# Patient Record
Sex: MALE | Race: ASIAN | NOT HISPANIC OR LATINO | URBAN - METROPOLITAN AREA
[De-identification: names, ages, dates, MRNs, and addresses within clinical notes are randomized per-mention and may not be internally consistent; named-entity substitution may affect disease eponyms.]

---

## 2021-04-26 PROBLEM — Z00.00 ENCOUNTER FOR PREVENTIVE HEALTH EXAMINATION: Status: ACTIVE | Noted: 2021-04-26

## 2021-04-28 PROBLEM — Z86.39 HISTORY OF HYPERLIPIDEMIA: Status: RESOLVED | Noted: 2021-04-28 | Resolved: 2021-04-28

## 2021-04-28 PROBLEM — Z86.39 HISTORY OF DIABETES MELLITUS: Status: RESOLVED | Noted: 2021-04-28 | Resolved: 2021-04-28

## 2021-04-28 PROBLEM — Z86.79 HISTORY OF CORONARY ARTERY DISEASE: Status: RESOLVED | Noted: 2021-04-28 | Resolved: 2021-04-28

## 2021-04-28 PROBLEM — Z87.891 FORMER SMOKER: Status: ACTIVE | Noted: 2021-04-28

## 2021-04-28 RX ORDER — TAMSULOSIN HYDROCHLORIDE 0.4 MG/1
0.4 CAPSULE ORAL
Refills: 0 | Status: ACTIVE | COMMUNITY

## 2021-04-28 RX ORDER — ROSUVASTATIN CALCIUM 20 MG/1
20 TABLET, FILM COATED ORAL
Refills: 0 | Status: ACTIVE | COMMUNITY

## 2021-04-28 RX ORDER — GLIPIZIDE 10 MG/1
10 TABLET ORAL
Refills: 0 | Status: ACTIVE | COMMUNITY

## 2021-04-28 RX ORDER — SITAGLIPTIN 100 MG/1
100 TABLET, FILM COATED ORAL
Refills: 0 | Status: ACTIVE | COMMUNITY

## 2021-04-28 RX ORDER — ISOSORBIDE MONONITRATE 30 MG
30 TABLET, EXTENDED RELEASE 24 HR ORAL
Refills: 0 | Status: ACTIVE | COMMUNITY

## 2021-04-28 RX ORDER — CLOPIDOGREL 75 MG/1
75 TABLET, FILM COATED ORAL
Refills: 0 | Status: ACTIVE | COMMUNITY

## 2021-04-28 RX ORDER — OLMESARTAN MEDOXOMIL 20 MG/1
20 TABLET, FILM COATED ORAL
Refills: 0 | Status: ACTIVE | COMMUNITY

## 2021-04-28 RX ORDER — FINASTERIDE 5 MG/1
5 TABLET, FILM COATED ORAL
Refills: 0 | Status: ACTIVE | COMMUNITY

## 2021-04-28 RX ORDER — ASPIRIN 81 MG
81 TABLET, DELAYED RELEASE (ENTERIC COATED) ORAL
Refills: 0 | Status: ACTIVE | COMMUNITY

## 2021-04-30 ENCOUNTER — OUTPATIENT (OUTPATIENT)
Dept: OUTPATIENT SERVICES | Facility: HOSPITAL | Age: 76
LOS: 1 days | End: 2021-04-30
Payer: COMMERCIAL

## 2021-04-30 ENCOUNTER — APPOINTMENT (OUTPATIENT)
Dept: THORACIC SURGERY | Facility: CLINIC | Age: 76
End: 2021-04-30
Payer: MEDICARE

## 2021-04-30 VITALS
SYSTOLIC BLOOD PRESSURE: 146 MMHG | HEART RATE: 71 BPM | OXYGEN SATURATION: 95 % | RESPIRATION RATE: 17 BRPM | TEMPERATURE: 98 F | DIASTOLIC BLOOD PRESSURE: 67 MMHG | HEIGHT: 66 IN | BODY MASS INDEX: 26.36 KG/M2 | WEIGHT: 164 LBS

## 2021-04-30 DIAGNOSIS — Z86.79 PERSONAL HISTORY OF OTHER DISEASES OF THE CIRCULATORY SYSTEM: ICD-10-CM

## 2021-04-30 DIAGNOSIS — Z86.39 PERSONAL HISTORY OF OTHER ENDOCRINE, NUTRITIONAL AND METABOLIC DISEASE: ICD-10-CM

## 2021-04-30 DIAGNOSIS — Z01.818 ENCOUNTER FOR OTHER PREPROCEDURAL EXAMINATION: ICD-10-CM

## 2021-04-30 DIAGNOSIS — R91.1 SOLITARY PULMONARY NODULE: ICD-10-CM

## 2021-04-30 DIAGNOSIS — Z87.891 PERSONAL HISTORY OF NICOTINE DEPENDENCE: ICD-10-CM

## 2021-04-30 LAB
ALBUMIN SERPL ELPH-MCNC: 4.5 G/DL — SIGNIFICANT CHANGE UP (ref 3.3–5)
ALP SERPL-CCNC: 68 U/L — SIGNIFICANT CHANGE UP (ref 40–120)
ALT FLD-CCNC: 20 U/L — SIGNIFICANT CHANGE UP (ref 10–45)
ANION GAP SERPL CALC-SCNC: 13 MMOL/L — SIGNIFICANT CHANGE UP (ref 5–17)
APPEARANCE UR: CLEAR — SIGNIFICANT CHANGE UP
APTT BLD: 31.7 SEC — SIGNIFICANT CHANGE UP (ref 27.5–35.5)
AST SERPL-CCNC: 24 U/L — SIGNIFICANT CHANGE UP (ref 10–40)
BASOPHILS # BLD AUTO: 0.06 K/UL — SIGNIFICANT CHANGE UP (ref 0–0.2)
BASOPHILS NFR BLD AUTO: 0.9 % — SIGNIFICANT CHANGE UP (ref 0–2)
BILIRUB SERPL-MCNC: 0.4 MG/DL — SIGNIFICANT CHANGE UP (ref 0.2–1.2)
BILIRUB UR-MCNC: NEGATIVE — SIGNIFICANT CHANGE UP
BUN SERPL-MCNC: 16 MG/DL — SIGNIFICANT CHANGE UP (ref 7–23)
CALCIUM SERPL-MCNC: 9.9 MG/DL — SIGNIFICANT CHANGE UP (ref 8.4–10.5)
CHLORIDE SERPL-SCNC: 105 MMOL/L — SIGNIFICANT CHANGE UP (ref 96–108)
CHOLEST SERPL-MCNC: 144 MG/DL — SIGNIFICANT CHANGE UP
CO2 SERPL-SCNC: 23 MMOL/L — SIGNIFICANT CHANGE UP (ref 22–31)
COLOR SPEC: YELLOW — SIGNIFICANT CHANGE UP
CREAT SERPL-MCNC: 0.97 MG/DL — SIGNIFICANT CHANGE UP (ref 0.5–1.3)
DIFF PNL FLD: NEGATIVE — SIGNIFICANT CHANGE UP
EOSINOPHIL # BLD AUTO: 0.53 K/UL — HIGH (ref 0–0.5)
EOSINOPHIL NFR BLD AUTO: 7.6 % — HIGH (ref 0–6)
GLUCOSE SERPL-MCNC: 195 MG/DL — HIGH (ref 70–99)
GLUCOSE UR QL: NEGATIVE — SIGNIFICANT CHANGE UP
HCT VFR BLD CALC: 41.3 % — SIGNIFICANT CHANGE UP (ref 39–50)
HDLC SERPL-MCNC: 59 MG/DL — SIGNIFICANT CHANGE UP
HGB BLD-MCNC: 13.6 G/DL — SIGNIFICANT CHANGE UP (ref 13–17)
IMM GRANULOCYTES NFR BLD AUTO: 0.3 % — SIGNIFICANT CHANGE UP (ref 0–1.5)
INR BLD: 0.96 — SIGNIFICANT CHANGE UP (ref 0.88–1.16)
KETONES UR-MCNC: NEGATIVE — SIGNIFICANT CHANGE UP
LEUKOCYTE ESTERASE UR-ACNC: NEGATIVE — SIGNIFICANT CHANGE UP
LIPID PNL WITH DIRECT LDL SERPL: 45 MG/DL — SIGNIFICANT CHANGE UP
LYMPHOCYTES # BLD AUTO: 2.79 K/UL — SIGNIFICANT CHANGE UP (ref 1–3.3)
LYMPHOCYTES # BLD AUTO: 40.1 % — SIGNIFICANT CHANGE UP (ref 13–44)
MCHC RBC-ENTMCNC: 30.8 PG — SIGNIFICANT CHANGE UP (ref 27–34)
MCHC RBC-ENTMCNC: 32.9 GM/DL — SIGNIFICANT CHANGE UP (ref 32–36)
MCV RBC AUTO: 93.7 FL — SIGNIFICANT CHANGE UP (ref 80–100)
MONOCYTES # BLD AUTO: 0.66 K/UL — SIGNIFICANT CHANGE UP (ref 0–0.9)
MONOCYTES NFR BLD AUTO: 9.5 % — SIGNIFICANT CHANGE UP (ref 2–14)
NEUTROPHILS # BLD AUTO: 2.9 K/UL — SIGNIFICANT CHANGE UP (ref 1.8–7.4)
NEUTROPHILS NFR BLD AUTO: 41.6 % — LOW (ref 43–77)
NITRITE UR-MCNC: NEGATIVE — SIGNIFICANT CHANGE UP
NON HDL CHOLESTEROL: 85 MG/DL — SIGNIFICANT CHANGE UP
NRBC # BLD: 0 /100 WBCS — SIGNIFICANT CHANGE UP (ref 0–0)
PH UR: 6 — SIGNIFICANT CHANGE UP (ref 5–8)
PLATELET # BLD AUTO: 157 K/UL — SIGNIFICANT CHANGE UP (ref 150–400)
POTASSIUM SERPL-MCNC: 4.1 MMOL/L — SIGNIFICANT CHANGE UP (ref 3.5–5.3)
POTASSIUM SERPL-SCNC: 4.1 MMOL/L — SIGNIFICANT CHANGE UP (ref 3.5–5.3)
PROT SERPL-MCNC: 7.5 G/DL — SIGNIFICANT CHANGE UP (ref 6–8.3)
PROT UR-MCNC: NEGATIVE MG/DL — SIGNIFICANT CHANGE UP
PROTHROM AB SERPL-ACNC: 11.5 SEC — SIGNIFICANT CHANGE UP (ref 10.6–13.6)
RBC # BLD: 4.41 M/UL — SIGNIFICANT CHANGE UP (ref 4.2–5.8)
RBC # FLD: 13.2 % — SIGNIFICANT CHANGE UP (ref 10.3–14.5)
SODIUM SERPL-SCNC: 141 MMOL/L — SIGNIFICANT CHANGE UP (ref 135–145)
SP GR SPEC: 1.01 — SIGNIFICANT CHANGE UP (ref 1–1.03)
TRIGL SERPL-MCNC: 199 MG/DL — HIGH
UROBILINOGEN FLD QL: 0.2 E.U./DL — SIGNIFICANT CHANGE UP
WBC # BLD: 6.96 K/UL — SIGNIFICANT CHANGE UP (ref 3.8–10.5)
WBC # FLD AUTO: 6.96 K/UL — SIGNIFICANT CHANGE UP (ref 3.8–10.5)

## 2021-04-30 PROCEDURE — 85025 COMPLETE CBC W/AUTO DIFF WBC: CPT

## 2021-04-30 PROCEDURE — 86901 BLOOD TYPING SEROLOGIC RH(D): CPT

## 2021-04-30 PROCEDURE — 99072 ADDL SUPL MATRL&STAF TM PHE: CPT

## 2021-04-30 PROCEDURE — 86850 RBC ANTIBODY SCREEN: CPT

## 2021-04-30 PROCEDURE — 81003 URINALYSIS AUTO W/O SCOPE: CPT

## 2021-04-30 PROCEDURE — 99204 OFFICE O/P NEW MOD 45 MIN: CPT

## 2021-04-30 PROCEDURE — 85610 PROTHROMBIN TIME: CPT

## 2021-04-30 PROCEDURE — 85730 THROMBOPLASTIN TIME PARTIAL: CPT

## 2021-04-30 PROCEDURE — 86900 BLOOD TYPING SEROLOGIC ABO: CPT

## 2021-04-30 PROCEDURE — 80053 COMPREHEN METABOLIC PANEL: CPT

## 2021-04-30 PROCEDURE — 36415 COLL VENOUS BLD VENIPUNCTURE: CPT

## 2021-04-30 PROCEDURE — 80061 LIPID PANEL: CPT

## 2021-04-30 NOTE — PHYSICAL EXAM
[] : no respiratory distress [Apical Impulse] : the apical impulse was normal [Respiration, Rhythm And Depth] : normal respiratory rhythm and effort [Examination Of The Chest] : the chest was normal in appearance [Abnormal Walk] : normal gait [Musculoskeletal - Swelling] : no joint swelling seen [Skin Color & Pigmentation] : normal skin color and pigmentation [Skin Turgor] : normal skin turgor [No Focal Deficits] : no focal deficits [Oriented To Time, Place, And Person] : oriented to person, place, and time

## 2021-05-01 ENCOUNTER — LABORATORY RESULT (OUTPATIENT)
Age: 76
End: 2021-05-01

## 2021-05-04 ENCOUNTER — OUTPATIENT (OUTPATIENT)
Dept: OUTPATIENT SERVICES | Facility: HOSPITAL | Age: 76
LOS: 1 days | End: 2021-05-04
Payer: MEDICARE

## 2021-05-04 DIAGNOSIS — R91.1 SOLITARY PULMONARY NODULE: ICD-10-CM

## 2021-05-04 PROCEDURE — 94726 PLETHYSMOGRAPHY LUNG VOLUMES: CPT | Mod: 26

## 2021-05-04 PROCEDURE — 94729 DIFFUSING CAPACITY: CPT

## 2021-05-04 PROCEDURE — 94010 BREATHING CAPACITY TEST: CPT | Mod: 26

## 2021-05-04 PROCEDURE — 94729 DIFFUSING CAPACITY: CPT | Mod: 26

## 2021-05-04 PROCEDURE — 94726 PLETHYSMOGRAPHY LUNG VOLUMES: CPT

## 2021-05-04 PROCEDURE — 94060 EVALUATION OF WHEEZING: CPT

## 2021-05-04 PROCEDURE — 94760 N-INVAS EAR/PLS OXIMETRY 1: CPT

## 2021-05-10 ENCOUNTER — NON-APPOINTMENT (OUTPATIENT)
Age: 76
End: 2021-05-10

## 2021-05-10 NOTE — END OF VISIT
[Time Spent: ___ minutes] : I have spent [unfilled] minutes of time on the encounter. [FreeTextEntry3] : I, ETHEL COLMENARES , am scribing for and in the presence of NITHIN ZARCO the following sections: history of present illness, past medical/family/surgical/family/social history, review of systems, vital signs, physical exam, and disposition.\par  \par I personally performed the services described in the documentation, reviewed the documentation recorded by the scribe in my presence and it accurately and completely records my words and actions.

## 2021-05-10 NOTE — CONSULT LETTER
[Consult Letter:] : I had the pleasure of evaluating your patient, [unfilled]. [Please see my note below.] : Please see my note below. [Consult Closing:] : Thank you very much for allowing me to participate in the care of this patient.  If you have any questions, please do not hesitate to contact me. [Sincerely,] : Sincerely, [Dear  ___] : Dear  [unfilled], [FreeTextEntry3] : Patrick Paul MD\par Professor, Cardiovascular & Thoracic Surgery\par [employer]\par Director of the [facility] \par Director of Thoracic Surgery, [facility]\par \par [facility]\par [address]\par [facility] 4th Floor\par [place] [address]\par Phone: [phone]\par Fax: [phone]

## 2021-05-10 NOTE — ASSESSMENT
[FreeTextEntry1] : 76 year old male, Cantonese speaking, former smoker,  PMHx, HLD, DM, PAD< CAD s/p stents 2006, 2009, referred by PCP Dr. Son Ibarra for evaluation of lung nodule found on lung cancer screening CT. \par \par CT Chest 12/20/19\par - Stable RUL groundglass nodule dating back to 2015. Continued annual follow up is recommended. \par - Additional stable subcentimeter pulmonary nodules. \par - No new nodule is identified.\par \par CT Chest 4/23/21\par - Persistent 1.2cm subsolid ground-glass nodular opacity in the RUL. The overall size of the lesion is not significantly changed dating back to 2015. However, a developing small solid component (0.3cm) and increased density are noted within the lesion. This raises suspicious for a malignant process in the adenocarcinomatous spectrum. Histological correlation should be considered. \par - Additional subcentimeter nodules are stable. \par - Thyromegaly.\par - Hepatic steatosis. \par \par Patient reports occasional cough with mucus. Denies SOB, cough, hemoptysis, chest discomfort, fever/chills. \par \par Previous CT chest 4/23/21 reviewed and revealed a slight increase in density of the RUL lung nodule, likely an indole lung cancer.  In order to fully rule out malignancy a surgical biopsy is advised. Therefore, we recommended surgical excisional biopsy with wedge resection followed by intraoperative frozen section and possible anatomic lobectomy with lymph node dissection. The surgical approach, risks, benefits, and alternatives were explained to the patient, who understood and agreed to the above. \par \par Will order PET to evaluate for extrathoracic disease and PFTs to evaluate lung function. \par \par I have reviewed the patient's medical records and diagnostic images at the time of this office consultation and have made the following recommendation.\par Plan:\par 1. PFTs\par 2. PET\par 3. Medical clearance\par 4. Cardiac clearance, hold PLAVIX prior to surgery. Stay on ASA 81mg\par 5. Labs drawn today\par 6. RVATS, robotic assisted, wedge resection of RUL, possible segmentectomy, possible lobectomy, MLND

## 2021-05-11 ENCOUNTER — LABORATORY RESULT (OUTPATIENT)
Age: 76
End: 2021-05-11

## 2021-05-13 ENCOUNTER — TRANSCRIPTION ENCOUNTER (OUTPATIENT)
Age: 76
End: 2021-05-13

## 2021-05-13 VITALS
HEIGHT: 66 IN | DIASTOLIC BLOOD PRESSURE: 72 MMHG | TEMPERATURE: 98 F | SYSTOLIC BLOOD PRESSURE: 157 MMHG | WEIGHT: 168.65 LBS | OXYGEN SATURATION: 96 % | HEART RATE: 72 BPM | RESPIRATION RATE: 18 BRPM

## 2021-05-13 NOTE — H&P ADULT - ASSESSMENT
Patient cleared by PCP and cardiologist. Plan for RVATS, robotic assisted, wedge resection of RUL, possible segmentectomy, possible lobectomy, MLND.    Patient cleared by PCP and cardiologist. Plan for RVATS, robotic assisted, wedge resection of RUL, possible segmentectomy, possible lobectomy, MLND.     Neurovascular: No delirium. Pain well controlled with current regimen.  -PRN's.    Cardiovascular:   - CAD with previously stent placement:  - resume plavix and ASA post op   - continue Statin   - closely cardiac monitor     Respiratory: lung nodule   - intervention for diagnosis and staging   - keep 02 Sat = 98% on RA.  - If on oxygen wean to RA from for O2 Sat > 93%.  - Encourage C+DB and Use of IS 10x / hr while awake.  - post up CXR and daily     GI: Stable.  -NPO after MN.  -PPX.  -PO Diet.    Renal / : stable   -Monitor renal function.  -Monitor I/O's.    Endocrine: DMT2   - pending A1c.  - resume antihyperglycemic control     Hematologic: H/H stable  - type and screen and hold blood product for procedures  - continue to monitor for CBC and Coagulation Panel.    ID: afebrile and WBC stable  - continue to monitor for Tempature, CBC.  - Observe for SIRS/Sepsis Syndrome.    Prophylaxis:  - DVT prophylaxis with 5000 SubQ Heparin q8h and SCD's    Disposition:  - intervention  - 9LA for post op observation

## 2021-05-13 NOTE — H&P ADULT - NSICDXPASTSURGICALHX_GEN_ALL_CORE_FT
PAST SURGICAL HISTORY:  Surgery, elective Cardiac stents X 3 (about 2016)    Surgery, elective external kose nodule

## 2021-05-13 NOTE — H&P ADULT - HISTORY OF PRESENT ILLNESS
76 year old male, Cantonese speaking, former smoker, PMHx, HLD, DM, PAD< CAD s/p stents 2006, 2009, referred by PCP Dr. Son Ibarra for evaluation of RUL lung nodule found on lung cancer screening CT. Patient is scheduled for RVATS, robotic assisted, wedge resection of RUL, possible segmentectomy, possible lobectomy, MLND.

## 2021-05-13 NOTE — H&P ADULT - NSHPLABSRESULTS_GEN_ALL_CORE
PET 5/3/21  - RUL groundglas nodule shows no metabolic activity.   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

## 2021-05-13 NOTE — H&P ADULT - NSICDXPASTMEDICALHX_GEN_ALL_CORE_FT
PAST MEDICAL HISTORY:  CAD (coronary artery disease)     CAD (coronary artery disease)     DM (diabetes mellitus)     Fatty liver

## 2021-05-14 ENCOUNTER — APPOINTMENT (OUTPATIENT)
Dept: THORACIC SURGERY | Facility: HOSPITAL | Age: 76
End: 2021-05-14

## 2021-05-14 ENCOUNTER — RESULT REVIEW (OUTPATIENT)
Age: 76
End: 2021-05-14

## 2021-05-14 ENCOUNTER — INPATIENT (INPATIENT)
Facility: HOSPITAL | Age: 76
LOS: 1 days | Discharge: HOME | DRG: 164 | End: 2021-05-16
Attending: THORACIC SURGERY (CARDIOTHORACIC VASCULAR SURGERY) | Admitting: THORACIC SURGERY (CARDIOTHORACIC VASCULAR SURGERY)
Payer: MEDICARE

## 2021-05-14 DIAGNOSIS — Z41.9 ENCOUNTER FOR PROCEDURE FOR PURPOSES OTHER THAN REMEDYING HEALTH STATE, UNSPECIFIED: Chronic | ICD-10-CM

## 2021-05-14 LAB
ANION GAP SERPL CALC-SCNC: 9 MMOL/L — SIGNIFICANT CHANGE UP (ref 5–17)
APTT BLD: 27.6 SEC — SIGNIFICANT CHANGE UP (ref 27.5–35.5)
BASOPHILS # BLD AUTO: 0.05 K/UL — SIGNIFICANT CHANGE UP (ref 0–0.2)
BASOPHILS NFR BLD AUTO: 0.5 % — SIGNIFICANT CHANGE UP (ref 0–2)
BUN SERPL-MCNC: 24 MG/DL — HIGH (ref 7–23)
CALCIUM SERPL-MCNC: 8.8 MG/DL — SIGNIFICANT CHANGE UP (ref 8.4–10.5)
CHLORIDE SERPL-SCNC: 104 MMOL/L — SIGNIFICANT CHANGE UP (ref 96–108)
CO2 SERPL-SCNC: 25 MMOL/L — SIGNIFICANT CHANGE UP (ref 22–31)
CREAT SERPL-MCNC: 0.99 MG/DL — SIGNIFICANT CHANGE UP (ref 0.5–1.3)
EOSINOPHIL # BLD AUTO: 0.11 K/UL — SIGNIFICANT CHANGE UP (ref 0–0.5)
EOSINOPHIL NFR BLD AUTO: 1.1 % — SIGNIFICANT CHANGE UP (ref 0–6)
GLUCOSE BLDC GLUCOMTR-MCNC: 114 MG/DL — HIGH (ref 70–99)
GLUCOSE BLDC GLUCOMTR-MCNC: 174 MG/DL — HIGH (ref 70–99)
GLUCOSE BLDC GLUCOMTR-MCNC: 200 MG/DL — HIGH (ref 70–99)
GLUCOSE BLDC GLUCOMTR-MCNC: 210 MG/DL — HIGH (ref 70–99)
GLUCOSE BLDC GLUCOMTR-MCNC: 423 MG/DL — HIGH (ref 70–99)
GLUCOSE SERPL-MCNC: 206 MG/DL — HIGH (ref 70–99)
HCT VFR BLD CALC: 37.7 % — LOW (ref 39–50)
HGB BLD-MCNC: 12.4 G/DL — LOW (ref 13–17)
IMM GRANULOCYTES NFR BLD AUTO: 0.4 % — SIGNIFICANT CHANGE UP (ref 0–1.5)
INR BLD: 1.04 — SIGNIFICANT CHANGE UP (ref 0.88–1.16)
LYMPHOCYTES # BLD AUTO: 1.55 K/UL — SIGNIFICANT CHANGE UP (ref 1–3.3)
LYMPHOCYTES # BLD AUTO: 14.8 % — SIGNIFICANT CHANGE UP (ref 13–44)
MCHC RBC-ENTMCNC: 31.4 PG — SIGNIFICANT CHANGE UP (ref 27–34)
MCHC RBC-ENTMCNC: 32.9 GM/DL — SIGNIFICANT CHANGE UP (ref 32–36)
MCV RBC AUTO: 95.4 FL — SIGNIFICANT CHANGE UP (ref 80–100)
MONOCYTES # BLD AUTO: 0.4 K/UL — SIGNIFICANT CHANGE UP (ref 0–0.9)
MONOCYTES NFR BLD AUTO: 3.8 % — SIGNIFICANT CHANGE UP (ref 2–14)
NEUTROPHILS # BLD AUTO: 8.29 K/UL — HIGH (ref 1.8–7.4)
NEUTROPHILS NFR BLD AUTO: 79.4 % — HIGH (ref 43–77)
NRBC # BLD: 0 /100 WBCS — SIGNIFICANT CHANGE UP (ref 0–0)
PLATELET # BLD AUTO: 142 K/UL — LOW (ref 150–400)
POTASSIUM SERPL-MCNC: 4.3 MMOL/L — SIGNIFICANT CHANGE UP (ref 3.5–5.3)
POTASSIUM SERPL-SCNC: 4.3 MMOL/L — SIGNIFICANT CHANGE UP (ref 3.5–5.3)
PROTHROM AB SERPL-ACNC: 12.4 SEC — SIGNIFICANT CHANGE UP (ref 10.6–13.6)
RBC # BLD: 3.95 M/UL — LOW (ref 4.2–5.8)
RBC # FLD: 13.1 % — SIGNIFICANT CHANGE UP (ref 10.3–14.5)
SODIUM SERPL-SCNC: 138 MMOL/L — SIGNIFICANT CHANGE UP (ref 135–145)
WBC # BLD: 10.44 K/UL — SIGNIFICANT CHANGE UP (ref 3.8–10.5)
WBC # FLD AUTO: 10.44 K/UL — SIGNIFICANT CHANGE UP (ref 3.8–10.5)

## 2021-05-14 PROCEDURE — 88309 TISSUE EXAM BY PATHOLOGIST: CPT | Mod: 26

## 2021-05-14 PROCEDURE — 71045 X-RAY EXAM CHEST 1 VIEW: CPT | Mod: 26

## 2021-05-14 PROCEDURE — 88305 TISSUE EXAM BY PATHOLOGIST: CPT | Mod: 26

## 2021-05-14 PROCEDURE — S2900 ROBOTIC SURGICAL SYSTEM: CPT | Mod: NC

## 2021-05-14 PROCEDURE — 32669 THORACOSCOPY REMOVE SEGMENT: CPT

## 2021-05-14 PROCEDURE — 32674 THORACOSCOPY LYMPH NODE EXC: CPT

## 2021-05-14 RX ORDER — LIDOCAINE 4 G/100G
1 CREAM TOPICAL DAILY
Refills: 0 | Status: DISCONTINUED | OUTPATIENT
Start: 2021-05-14 | End: 2021-05-16

## 2021-05-14 RX ORDER — HEPARIN SODIUM 5000 [USP'U]/ML
5000 INJECTION INTRAVENOUS; SUBCUTANEOUS EVERY 8 HOURS
Refills: 0 | Status: DISCONTINUED | OUTPATIENT
Start: 2021-05-14 | End: 2021-05-16

## 2021-05-14 RX ORDER — ATORVASTATIN CALCIUM 80 MG/1
20 TABLET, FILM COATED ORAL AT BEDTIME
Refills: 0 | Status: DISCONTINUED | OUTPATIENT
Start: 2021-05-14 | End: 2021-05-14

## 2021-05-14 RX ORDER — DEXTROSE 50 % IN WATER 50 %
25 SYRINGE (ML) INTRAVENOUS ONCE
Refills: 0 | Status: DISCONTINUED | OUTPATIENT
Start: 2021-05-14 | End: 2021-05-16

## 2021-05-14 RX ORDER — SITAGLIPTIN 50 MG/1
1 TABLET, FILM COATED ORAL
Qty: 0 | Refills: 0 | DISCHARGE

## 2021-05-14 RX ORDER — ASPIRIN/CALCIUM CARB/MAGNESIUM 324 MG
81 TABLET ORAL DAILY
Refills: 0 | Status: DISCONTINUED | OUTPATIENT
Start: 2021-05-14 | End: 2021-05-14

## 2021-05-14 RX ORDER — GLUCAGON INJECTION, SOLUTION 0.5 MG/.1ML
1 INJECTION, SOLUTION SUBCUTANEOUS ONCE
Refills: 0 | Status: DISCONTINUED | OUTPATIENT
Start: 2021-05-14 | End: 2021-05-16

## 2021-05-14 RX ORDER — HUMAN INSULIN 100 [IU]/ML
5 INJECTION, SUSPENSION SUBCUTANEOUS ONCE
Refills: 0 | Status: COMPLETED | OUTPATIENT
Start: 2021-05-14 | End: 2021-05-14

## 2021-05-14 RX ORDER — ATORVASTATIN CALCIUM 80 MG/1
20 TABLET, FILM COATED ORAL AT BEDTIME
Refills: 0 | Status: DISCONTINUED | OUTPATIENT
Start: 2021-05-14 | End: 2021-05-16

## 2021-05-14 RX ORDER — CLOPIDOGREL BISULFATE 75 MG/1
75 TABLET, FILM COATED ORAL DAILY
Refills: 0 | Status: DISCONTINUED | OUTPATIENT
Start: 2021-05-14 | End: 2021-05-14

## 2021-05-14 RX ORDER — SODIUM CHLORIDE 9 MG/ML
1000 INJECTION, SOLUTION INTRAVENOUS
Refills: 0 | Status: DISCONTINUED | OUTPATIENT
Start: 2021-05-14 | End: 2021-05-16

## 2021-05-14 RX ORDER — ROSUVASTATIN CALCIUM 5 MG/1
1 TABLET ORAL
Qty: 0 | Refills: 0 | DISCHARGE

## 2021-05-14 RX ORDER — CEFAZOLIN SODIUM 1 G
2000 VIAL (EA) INJECTION EVERY 8 HOURS
Refills: 0 | Status: DISCONTINUED | OUTPATIENT
Start: 2021-05-14 | End: 2021-05-14

## 2021-05-14 RX ORDER — CEFAZOLIN SODIUM 1 G
2000 VIAL (EA) INJECTION EVERY 8 HOURS
Refills: 0 | Status: COMPLETED | OUTPATIENT
Start: 2021-05-14 | End: 2021-05-15

## 2021-05-14 RX ORDER — BUPIVACAINE 13.3 MG/ML
20 INJECTION, SUSPENSION, LIPOSOMAL INFILTRATION ONCE
Refills: 0 | Status: DISCONTINUED | OUTPATIENT
Start: 2021-05-14 | End: 2021-05-14

## 2021-05-14 RX ORDER — INSULIN LISPRO 100/ML
3 VIAL (ML) SUBCUTANEOUS
Refills: 0 | Status: DISCONTINUED | OUTPATIENT
Start: 2021-05-14 | End: 2021-05-14

## 2021-05-14 RX ORDER — DEXTROSE 50 % IN WATER 50 %
15 SYRINGE (ML) INTRAVENOUS ONCE
Refills: 0 | Status: DISCONTINUED | OUTPATIENT
Start: 2021-05-14 | End: 2021-05-16

## 2021-05-14 RX ORDER — CLOPIDOGREL BISULFATE 75 MG/1
1 TABLET, FILM COATED ORAL
Qty: 0 | Refills: 0 | DISCHARGE

## 2021-05-14 RX ORDER — FAMOTIDINE 10 MG/ML
20 INJECTION INTRAVENOUS DAILY
Refills: 0 | Status: DISCONTINUED | OUTPATIENT
Start: 2021-05-14 | End: 2021-05-14

## 2021-05-14 RX ORDER — SENNA PLUS 8.6 MG/1
2 TABLET ORAL AT BEDTIME
Refills: 0 | Status: DISCONTINUED | OUTPATIENT
Start: 2021-05-14 | End: 2021-05-16

## 2021-05-14 RX ORDER — INSULIN LISPRO 100/ML
VIAL (ML) SUBCUTANEOUS
Refills: 0 | Status: DISCONTINUED | OUTPATIENT
Start: 2021-05-14 | End: 2021-05-16

## 2021-05-14 RX ORDER — ASPIRIN/CALCIUM CARB/MAGNESIUM 324 MG
81 TABLET ORAL DAILY
Refills: 0 | Status: DISCONTINUED | OUTPATIENT
Start: 2021-05-14 | End: 2021-05-16

## 2021-05-14 RX ORDER — MORPHINE SULFATE 50 MG/1
2 CAPSULE, EXTENDED RELEASE ORAL EVERY 6 HOURS
Refills: 0 | Status: DISCONTINUED | OUTPATIENT
Start: 2021-05-14 | End: 2021-05-15

## 2021-05-14 RX ORDER — ACETAMINOPHEN 500 MG
1000 TABLET ORAL ONCE
Refills: 0 | Status: COMPLETED | OUTPATIENT
Start: 2021-05-14 | End: 2021-05-14

## 2021-05-14 RX ORDER — ASPIRIN/CALCIUM CARB/MAGNESIUM 324 MG
1 TABLET ORAL
Qty: 0 | Refills: 0 | DISCHARGE

## 2021-05-14 RX ORDER — ISOSORBIDE MONONITRATE 60 MG/1
1 TABLET, EXTENDED RELEASE ORAL
Qty: 0 | Refills: 0 | DISCHARGE

## 2021-05-14 RX ORDER — DEXTROSE 50 % IN WATER 50 %
12.5 SYRINGE (ML) INTRAVENOUS ONCE
Refills: 0 | Status: DISCONTINUED | OUTPATIENT
Start: 2021-05-14 | End: 2021-05-16

## 2021-05-14 RX ORDER — PANTOPRAZOLE SODIUM 20 MG/1
40 TABLET, DELAYED RELEASE ORAL
Refills: 0 | Status: DISCONTINUED | OUTPATIENT
Start: 2021-05-14 | End: 2021-05-16

## 2021-05-14 RX ORDER — INSULIN GLARGINE 100 [IU]/ML
10 INJECTION, SOLUTION SUBCUTANEOUS AT BEDTIME
Refills: 0 | Status: DISCONTINUED | OUTPATIENT
Start: 2021-05-14 | End: 2021-05-14

## 2021-05-14 RX ORDER — OLMESARTAN MEDOXOMIL 5 MG/1
1 TABLET, FILM COATED ORAL
Qty: 0 | Refills: 0 | DISCHARGE

## 2021-05-14 RX ADMIN — Medication 400 MILLIGRAM(S): at 15:42

## 2021-05-14 RX ADMIN — Medication 2: at 11:39

## 2021-05-14 RX ADMIN — ATORVASTATIN CALCIUM 20 MILLIGRAM(S): 80 TABLET, FILM COATED ORAL at 23:05

## 2021-05-14 RX ADMIN — Medication 12: at 16:00

## 2021-05-14 RX ADMIN — Medication 2000 MILLIGRAM(S): at 15:56

## 2021-05-14 RX ADMIN — LIDOCAINE 1 PATCH: 4 CREAM TOPICAL at 23:05

## 2021-05-14 RX ADMIN — LIDOCAINE 1 PATCH: 4 CREAM TOPICAL at 11:10

## 2021-05-14 RX ADMIN — HUMAN INSULIN 5 UNIT(S): 100 INJECTION, SUSPENSION SUBCUTANEOUS at 17:20

## 2021-05-14 RX ADMIN — Medication 4: at 22:44

## 2021-05-14 RX ADMIN — HEPARIN SODIUM 5000 UNIT(S): 5000 INJECTION INTRAVENOUS; SUBCUTANEOUS at 23:02

## 2021-05-14 RX ADMIN — Medication 81 MILLIGRAM(S): at 15:42

## 2021-05-14 RX ADMIN — Medication 1000 MILLIGRAM(S): at 16:00

## 2021-05-14 RX ADMIN — HEPARIN SODIUM 5000 UNIT(S): 5000 INJECTION INTRAVENOUS; SUBCUTANEOUS at 15:42

## 2021-05-14 RX ADMIN — SENNA PLUS 2 TABLET(S): 8.6 TABLET ORAL at 23:03

## 2021-05-14 RX ADMIN — SODIUM CHLORIDE 50 MILLILITER(S): 9 INJECTION, SOLUTION INTRAVENOUS at 18:00

## 2021-05-14 NOTE — BRIEF OPERATIVE NOTE - COMMENTS
Dr. Small was the first assistant for this case including but not limited to docking the robot and right upper lobe segmentectomy     I was present for this procedure and participated as first assistant as described by the PA above, unless otherwise noted below.

## 2021-05-14 NOTE — BRIEF OPERATIVE NOTE - NSICDXBRIEFPROCEDURE_GEN_ALL_CORE_FT
PROCEDURES:  Lobectomy, lung, upper lobe, right, robot-assisted 14-May-2021 10:31:54 segmentectomy Tavares Tong

## 2021-05-14 NOTE — PROGRESS NOTE ADULT - ASSESSMENT
Patient cleared by PCP and cardiologist. Plan for RVATS, robotic assisted, wedge resection of RUL, possible segmentectomy, possible lobectomy, MLND.     Neurovascular: No delirium. Pain well controlled with current regimen.  -PRN's.    Cardiovascular:   - CAD with previously stent placement:  - resume plavix and ASA post op   - continue Statin   - closely cardiac monitor     Respiratory: lung nodule   - intervention for diagnosis and staging   - keep 02 Sat = 98% on RA.  - If on oxygen wean to RA from for O2 Sat > 93%.  - Encourage C+DB and Use of IS 10x / hr while awake.  - post up CXR and daily     GI: Stable.  -NPO after MN.  -PPX.  -PO Diet.    Renal / : stable   -Monitor renal function.  -Monitor I/O's.    Endocrine: DMT2   - pending A1c.  - resume antihyperglycemic control     Hematologic: H/H stable  - type and screen and hold blood product for procedures  - continue to monitor for CBC and Coagulation Panel.    ID: afebrile and WBC stable  - continue to monitor for Tempature, CBC.  - Observe for SIRS/Sepsis Syndrome.    Prophylaxis:  - DVT prophylaxis with 5000 SubQ Heparin q8h and SCD's    Disposition:  - intervention  - 9LA for post op observation  76 year old male, Cantonese speaking, former smoker, PMHx, HLD, DM, PAD< CAD s/p stents 2006, 2009, referred by PCP Dr. Son Ibarra for evaluation of RUL lung nodule found on lung cancer screening CT. Patient presented to [facility] today for elective RVATS, robotic assisted, wedge resection of RUL, possible segmentectomy, MLND. Operation was uncomplicated and pt was transferred to the PACU in stable condition with 1 CT to suction and no airleak.     Neurovascular: No delirium.  - IV tylenol/morphine PRN pain     Cardiovascular:   - CAD: with previously stent placement: continue aspirin, f/u when to restart plavix   - HLD: continue statin   - Monitor hr/bp/tele    Respiratory:   - POD 0 RUL segmentectomy and MLND 2/2 lung nodule  - CT to suction no AL minimal drainage  - CXR showed CT in place, small apical ptx  - Former smoker, wean O2 as tolerated   - Daily CXR  - Encourage C+DB and Use of IS 10x / hr while awake.    GI: Stable.  -ADAT  -PPX.  -Bowel regimen    Renal / : 9/0.99  -Monitor renal function.  -Monitor I/O's.  -Replete lytes PRN     Endocrine: DMT2 on oral meds   - pending A1c, continue ISS  - no hx thyroid disease    Hematologic: H/H 12/37  - H&H stable, continue to monitor  - DVT ppx: SQH 5000 TID, SCDs b/l     ID: afebrile and WBC 10  - Complete carmelina-op abx  - Observe for SIRS/Sepsis Syndrome.    Disposition:  -Home when medically ready

## 2021-05-15 ENCOUNTER — TRANSCRIPTION ENCOUNTER (OUTPATIENT)
Age: 76
End: 2021-05-15

## 2021-05-15 LAB
A1C WITH ESTIMATED AVERAGE GLUCOSE RESULT: 7.2 % — HIGH (ref 4–5.6)
ANION GAP SERPL CALC-SCNC: 11 MMOL/L — SIGNIFICANT CHANGE UP (ref 5–17)
BASOPHILS # BLD AUTO: 0.02 K/UL — SIGNIFICANT CHANGE UP (ref 0–0.2)
BASOPHILS NFR BLD AUTO: 0.2 % — SIGNIFICANT CHANGE UP (ref 0–2)
BUN SERPL-MCNC: 18 MG/DL — SIGNIFICANT CHANGE UP (ref 7–23)
CALCIUM SERPL-MCNC: 9 MG/DL — SIGNIFICANT CHANGE UP (ref 8.4–10.5)
CHLORIDE SERPL-SCNC: 107 MMOL/L — SIGNIFICANT CHANGE UP (ref 96–108)
CO2 SERPL-SCNC: 22 MMOL/L — SIGNIFICANT CHANGE UP (ref 22–31)
CREAT SERPL-MCNC: 0.99 MG/DL — SIGNIFICANT CHANGE UP (ref 0.5–1.3)
EOSINOPHIL # BLD AUTO: 0 K/UL — SIGNIFICANT CHANGE UP (ref 0–0.5)
EOSINOPHIL NFR BLD AUTO: 0 % — SIGNIFICANT CHANGE UP (ref 0–6)
ESTIMATED AVERAGE GLUCOSE: 160 MG/DL — HIGH (ref 68–114)
GLUCOSE BLDC GLUCOMTR-MCNC: 112 MG/DL — HIGH (ref 70–99)
GLUCOSE BLDC GLUCOMTR-MCNC: 127 MG/DL — HIGH (ref 70–99)
GLUCOSE BLDC GLUCOMTR-MCNC: 171 MG/DL — HIGH (ref 70–99)
GLUCOSE BLDC GLUCOMTR-MCNC: 184 MG/DL — HIGH (ref 70–99)
GLUCOSE SERPL-MCNC: 122 MG/DL — HIGH (ref 70–99)
HCT VFR BLD CALC: 36.5 % — LOW (ref 39–50)
HCV AB S/CO SERPL IA: 0.04 S/CO — SIGNIFICANT CHANGE UP
HCV AB SERPL-IMP: SIGNIFICANT CHANGE UP
HGB BLD-MCNC: 12.1 G/DL — LOW (ref 13–17)
IMM GRANULOCYTES NFR BLD AUTO: 0.3 % — SIGNIFICANT CHANGE UP (ref 0–1.5)
LYMPHOCYTES # BLD AUTO: 1.58 K/UL — SIGNIFICANT CHANGE UP (ref 1–3.3)
LYMPHOCYTES # BLD AUTO: 13.3 % — SIGNIFICANT CHANGE UP (ref 13–44)
MAGNESIUM SERPL-MCNC: 2.2 MG/DL — SIGNIFICANT CHANGE UP (ref 1.6–2.6)
MCHC RBC-ENTMCNC: 31.3 PG — SIGNIFICANT CHANGE UP (ref 27–34)
MCHC RBC-ENTMCNC: 33.2 GM/DL — SIGNIFICANT CHANGE UP (ref 32–36)
MCV RBC AUTO: 94.3 FL — SIGNIFICANT CHANGE UP (ref 80–100)
MONOCYTES # BLD AUTO: 1.12 K/UL — HIGH (ref 0–0.9)
MONOCYTES NFR BLD AUTO: 9.4 % — SIGNIFICANT CHANGE UP (ref 2–14)
NEUTROPHILS # BLD AUTO: 9.16 K/UL — HIGH (ref 1.8–7.4)
NEUTROPHILS NFR BLD AUTO: 76.8 % — SIGNIFICANT CHANGE UP (ref 43–77)
NRBC # BLD: 0 /100 WBCS — SIGNIFICANT CHANGE UP (ref 0–0)
PLATELET # BLD AUTO: 141 K/UL — LOW (ref 150–400)
POTASSIUM SERPL-MCNC: 3.8 MMOL/L — SIGNIFICANT CHANGE UP (ref 3.5–5.3)
POTASSIUM SERPL-SCNC: 3.8 MMOL/L — SIGNIFICANT CHANGE UP (ref 3.5–5.3)
RBC # BLD: 3.87 M/UL — LOW (ref 4.2–5.8)
RBC # FLD: 13.2 % — SIGNIFICANT CHANGE UP (ref 10.3–14.5)
SODIUM SERPL-SCNC: 140 MMOL/L — SIGNIFICANT CHANGE UP (ref 135–145)
WBC # BLD: 11.92 K/UL — HIGH (ref 3.8–10.5)
WBC # FLD AUTO: 11.92 K/UL — HIGH (ref 3.8–10.5)

## 2021-05-15 PROCEDURE — 71045 X-RAY EXAM CHEST 1 VIEW: CPT | Mod: 26

## 2021-05-15 PROCEDURE — 71045 X-RAY EXAM CHEST 1 VIEW: CPT | Mod: 26,76

## 2021-05-15 RX ORDER — ACETAMINOPHEN 500 MG
650 TABLET ORAL EVERY 6 HOURS
Refills: 0 | Status: DISCONTINUED | OUTPATIENT
Start: 2021-05-15 | End: 2021-05-16

## 2021-05-15 RX ORDER — POTASSIUM CHLORIDE 20 MEQ
20 PACKET (EA) ORAL ONCE
Refills: 0 | Status: COMPLETED | OUTPATIENT
Start: 2021-05-15 | End: 2021-05-15

## 2021-05-15 RX ORDER — OXYCODONE HYDROCHLORIDE 5 MG/1
5 TABLET ORAL EVERY 6 HOURS
Refills: 0 | Status: DISCONTINUED | OUTPATIENT
Start: 2021-05-15 | End: 2021-05-16

## 2021-05-15 RX ORDER — CLOPIDOGREL BISULFATE 75 MG/1
75 TABLET, FILM COATED ORAL DAILY
Refills: 0 | Status: DISCONTINUED | OUTPATIENT
Start: 2021-05-16 | End: 2021-05-16

## 2021-05-15 RX ADMIN — Medication 2: at 17:38

## 2021-05-15 RX ADMIN — PANTOPRAZOLE SODIUM 40 MILLIGRAM(S): 20 TABLET, DELAYED RELEASE ORAL at 06:42

## 2021-05-15 RX ADMIN — Medication 2000 MILLIGRAM(S): at 23:18

## 2021-05-15 RX ADMIN — HEPARIN SODIUM 5000 UNIT(S): 5000 INJECTION INTRAVENOUS; SUBCUTANEOUS at 21:26

## 2021-05-15 RX ADMIN — Medication 2: at 21:27

## 2021-05-15 RX ADMIN — Medication 2000 MILLIGRAM(S): at 16:48

## 2021-05-15 RX ADMIN — Medication 2000 MILLIGRAM(S): at 00:12

## 2021-05-15 RX ADMIN — Medication 81 MILLIGRAM(S): at 14:09

## 2021-05-15 RX ADMIN — Medication 2000 MILLIGRAM(S): at 08:03

## 2021-05-15 RX ADMIN — SENNA PLUS 2 TABLET(S): 8.6 TABLET ORAL at 21:26

## 2021-05-15 RX ADMIN — ATORVASTATIN CALCIUM 20 MILLIGRAM(S): 80 TABLET, FILM COATED ORAL at 21:27

## 2021-05-15 RX ADMIN — HEPARIN SODIUM 5000 UNIT(S): 5000 INJECTION INTRAVENOUS; SUBCUTANEOUS at 14:08

## 2021-05-15 RX ADMIN — HEPARIN SODIUM 5000 UNIT(S): 5000 INJECTION INTRAVENOUS; SUBCUTANEOUS at 06:42

## 2021-05-15 RX ADMIN — Medication 20 MILLIEQUIVALENT(S): at 09:15

## 2021-05-15 NOTE — DISCHARGE NOTE PROVIDER - NSDCCPCAREPLAN_GEN_ALL_CORE_FT
PRINCIPAL DISCHARGE DIAGNOSIS  Diagnosis: Nodule of right lung  Assessment and Plan of Treatment: You underwent a right upper lobe segmentectomy which means the lung nodule was removed. The surgical pathology is pending.

## 2021-05-15 NOTE — DISCHARGE NOTE PROVIDER - NSDCCPTREATMENT_GEN_ALL_CORE_FT
PRINCIPAL PROCEDURE  Procedure: Segmentectomy, lung, using VATS  Findings and Treatment: Right upper lobe,

## 2021-05-15 NOTE — DISCHARGE NOTE PROVIDER - NSDCFUADDINST_GEN_ALL_CORE_FT
-Walk daily as tolerated and use your incentive spirometer every hour.    -No driving or strenuous activity/exercise for 6 weeks, or until cleared by your surgeon.     -Gently clean your incisions with anti-bacterial soap and water, pat dry.  You may leave them open to air.    -Call your doctor if you have shortness of breath, chest pain not relieved by pain medication, dizziness, fever >101.5, or increased redness or drainage from incisions.   The hospital does no stock your home medication Benicar (olmesartan). Please take it when you get home.    -Walk daily as tolerated and use your incentive spirometer every hour.    -No driving or strenuous activity/exercise until cleared by your surgeon.     -Gently clean your incisions with anti-bacterial soap and water, pat dry.  You may leave them open to air.    -Call your doctor if you have shortness of breath, chest pain not relieved by pain medication, dizziness, fever >101.5, or increased redness or drainage from incisions.

## 2021-05-15 NOTE — PROGRESS NOTE ADULT - ASSESSMENT
76 year old male, Cantonese speaking, former smoker, PMHx, HLD, DM, PAD< CAD s/p stents 2006, 2009, referred by PCP Dr. Son Ibarra for evaluation of RUL lung nodule found on lung cancer screening CT. Patient presented to [facility] today for elective RVATS, robotic assisted, wedge resection of RUL, possible segmentectomy, MLND. Operation was uncomplicated and pt was transferred to the PACU in stable condition with 1 CT to suction and no air leak. On POD1, CT placed to waterseal. CXR with right apex airspace slightly larger on repeat scan. Repeat CXR pending.    Neurovascular: No delirium.  - IV tylenol/oxycodone PRN pain     Cardiovascular:   - CAD: with previously stent placement: continue aspirin, Plavix to start in AM  - HLD: continue statin   - Monitor hr/bp/tele    Respiratory:   - POD 1 RUL segmentectomy and MLND 2/2 lung nodule  - CT to suction no AL, minimal drainage  - CXR showed CT in place, small apical air space on waterseal. Repeat XR pending.  - Former smoker, wean O2 as tolerated   - Daily CXR  - Encourage C+DB and Use of IS 10x / hr while awake.    GI: Stable.  -ADAT  -PPX with protonix  -Bowel regimen    Renal / :   -Monitor renal function.  -Monitor I/O's.  -Replete lytes PRN     Endocrine:  -  DMT2 on oral meds   - continue ISS    Hematologic: H/H 12/37  - H&H stable, continue to monitor  - DVT ppx: SQH 5000 TID, SCDs b/l     ID: afebrile and WBC 10  - Complete carmelina-op abx  - Observe for SIRS/Sepsis Syndrome.    Disposition:  -Home when medically ready

## 2021-05-15 NOTE — DISCHARGE NOTE PROVIDER - CARE PROVIDER_API CALL
Patrick Paul (MD)  Surgery; Thoracic Surgery  [phone] [place] [address]  Phone: [phone]  Fax: [phone]  Follow Up Time: 1 week

## 2021-05-15 NOTE — DISCHARGE NOTE PROVIDER - HOSPITAL COURSE
Mr. Donis is a 76 year old male, Cantonese speaking, former smoker, with a PMH of HLD, DM, PAD, CAD s/p stents 2006 and 2009, who was referred by PCP Dr. Son Ibarra to Dr. Paul for evaluation of RUL lung nodule found on lung cancer screening CT. He presented to [facility] on 5/14/21 for elective RVATS, robotic assisted, right upper lobe segmentectomy and mediastinal lymph node dissection. Surgical pathology is pending result. He recovered and PACU immediately postop with one right pleural CT to suction and no air leak. CXR showed right  On POD1, CT placed to waterseal. CXR with right apex airspace slightly larger on repeat scan. Repeat CXR pending. Mr. Donis is a 76 year old male, Cantonese speaking, former smoker, with a PMH of HLD, DM, PAD, CAD s/p stents 2006 and 2009, who was referred by PCP Dr. Son Ibarra to Dr. Paul for evaluation of RUL lung nodule found on lung cancer screening CT. He presented to [facility] on 5/14/21 for elective RVATS, robotic assisted, right upper lobe segmentectomy and mediastinal lymph node dissection. Surgical pathology is pending result. He recovered and PACU immediately postop with one right pleural CT to suction and no air leak. CXR showed right apical air space about 1cm. On POD 1, CT placed to waterseal. CXR with right apex airspace appeared slightly larger on repeat CXR. Chest XR was repeated and air space was stable. Right chest tube was removed. Follow up chest XR showed stable right apical air space. Mr. Donis is a 76 year old male, Cantonese speaking, former smoker, with a PMH of HLD, DM, PAD, CAD s/p stents 2006 and 2009, who was referred by PCP Dr. Son Ibarra to Dr. Paul for evaluation of RUL lung nodule found on lung cancer screening CT. He presented to [facility] on 5/14/21 for elective RVATS, robotic assisted, right upper lobe segmentectomy and mediastinal lymph node dissection. Surgical pathology is pending result. He recovered and PACU immediately postop with one right pleural CT to suction and no air leak. CXR showed right apical air space about 1cm. On POD 1, CT placed to waterseal. CXR with right apex airspace appeared slightly larger on repeat CXR. Chest XR was repeated and air space was stable. Right chest tube was removed. Follow up chest XR showed stable right apical air space.  On POD2, morning chest xr was stable. Hemoptysis now decreased. He does have a hoarse voice, but he is tolerating a PO diet with no difficulty. Benicar and Imdur resumed. He was cleared for discharge home and will follow up with Dr. Paul as an outpatient.    Discussed with patient and his daughter, Luz Maria, that he does not need any refills on his prescriptions.     35 minutes was spent with the patient reviewing the discharge material including medications, follow up appointments, recovery, concerning symptoms, and how to contact their health care providers if they have questions

## 2021-05-15 NOTE — DISCHARGE NOTE PROVIDER - NSDCMRMEDTOKEN_GEN_ALL_CORE_FT
aspirin 81 mg oral tablet, chewable: 1 tab(s) orally once a day  Benicar 20 mg oral tablet: 1 tab(s) orally once a day  Crestor 20 mg oral tablet: 1 tab(s) orally once a day  glipiZIDE 10 mg oral tablet: 1 tab(s) orally once a day  isosorbide mononitrate 30 mg oral tablet, extended release: 1 tab(s) orally once a day (in the morning)  Januvia 100 mg oral tablet: 1 tab(s) orally once a day  Plavix 75 mg oral tablet: 1 tab(s) orally once a day

## 2021-05-16 ENCOUNTER — TRANSCRIPTION ENCOUNTER (OUTPATIENT)
Age: 76
End: 2021-05-16

## 2021-05-16 VITALS — TEMPERATURE: 99 F

## 2021-05-16 LAB
ANION GAP SERPL CALC-SCNC: 11 MMOL/L — SIGNIFICANT CHANGE UP (ref 5–17)
BUN SERPL-MCNC: 22 MG/DL — SIGNIFICANT CHANGE UP (ref 7–23)
CALCIUM SERPL-MCNC: 8.9 MG/DL — SIGNIFICANT CHANGE UP (ref 8.4–10.5)
CHLORIDE SERPL-SCNC: 102 MMOL/L — SIGNIFICANT CHANGE UP (ref 96–108)
CO2 SERPL-SCNC: 25 MMOL/L — SIGNIFICANT CHANGE UP (ref 22–31)
CREAT SERPL-MCNC: 1.05 MG/DL — SIGNIFICANT CHANGE UP (ref 0.5–1.3)
GLUCOSE BLDC GLUCOMTR-MCNC: 129 MG/DL — HIGH (ref 70–99)
GLUCOSE BLDC GLUCOMTR-MCNC: 184 MG/DL — HIGH (ref 70–99)
GLUCOSE SERPL-MCNC: 146 MG/DL — HIGH (ref 70–99)
HCT VFR BLD CALC: 35.5 % — LOW (ref 39–50)
HGB BLD-MCNC: 11.9 G/DL — LOW (ref 13–17)
MAGNESIUM SERPL-MCNC: 2.1 MG/DL — SIGNIFICANT CHANGE UP (ref 1.6–2.6)
MCHC RBC-ENTMCNC: 31.4 PG — SIGNIFICANT CHANGE UP (ref 27–34)
MCHC RBC-ENTMCNC: 33.5 GM/DL — SIGNIFICANT CHANGE UP (ref 32–36)
MCV RBC AUTO: 93.7 FL — SIGNIFICANT CHANGE UP (ref 80–100)
NRBC # BLD: 0 /100 WBCS — SIGNIFICANT CHANGE UP (ref 0–0)
PLATELET # BLD AUTO: 130 K/UL — LOW (ref 150–400)
POTASSIUM SERPL-MCNC: 3.8 MMOL/L — SIGNIFICANT CHANGE UP (ref 3.5–5.3)
POTASSIUM SERPL-SCNC: 3.8 MMOL/L — SIGNIFICANT CHANGE UP (ref 3.5–5.3)
RBC # BLD: 3.79 M/UL — LOW (ref 4.2–5.8)
RBC # FLD: 13 % — SIGNIFICANT CHANGE UP (ref 10.3–14.5)
SODIUM SERPL-SCNC: 138 MMOL/L — SIGNIFICANT CHANGE UP (ref 135–145)
WBC # BLD: 11.54 K/UL — HIGH (ref 3.8–10.5)
WBC # FLD AUTO: 11.54 K/UL — HIGH (ref 3.8–10.5)

## 2021-05-16 PROCEDURE — 88305 TISSUE EXAM BY PATHOLOGIST: CPT

## 2021-05-16 PROCEDURE — 85730 THROMBOPLASTIN TIME PARTIAL: CPT

## 2021-05-16 PROCEDURE — 83735 ASSAY OF MAGNESIUM: CPT

## 2021-05-16 PROCEDURE — 71045 X-RAY EXAM CHEST 1 VIEW: CPT

## 2021-05-16 PROCEDURE — 88309 TISSUE EXAM BY PATHOLOGIST: CPT

## 2021-05-16 PROCEDURE — S2900: CPT

## 2021-05-16 PROCEDURE — 85025 COMPLETE CBC W/AUTO DIFF WBC: CPT

## 2021-05-16 PROCEDURE — 82962 GLUCOSE BLOOD TEST: CPT

## 2021-05-16 PROCEDURE — 83036 HEMOGLOBIN GLYCOSYLATED A1C: CPT

## 2021-05-16 PROCEDURE — 86850 RBC ANTIBODY SCREEN: CPT

## 2021-05-16 PROCEDURE — 86901 BLOOD TYPING SEROLOGIC RH(D): CPT

## 2021-05-16 PROCEDURE — 80048 BASIC METABOLIC PNL TOTAL CA: CPT

## 2021-05-16 PROCEDURE — 85610 PROTHROMBIN TIME: CPT

## 2021-05-16 PROCEDURE — 86900 BLOOD TYPING SEROLOGIC ABO: CPT

## 2021-05-16 PROCEDURE — 85027 COMPLETE CBC AUTOMATED: CPT

## 2021-05-16 PROCEDURE — 36415 COLL VENOUS BLD VENIPUNCTURE: CPT

## 2021-05-16 PROCEDURE — 86803 HEPATITIS C AB TEST: CPT

## 2021-05-16 PROCEDURE — 71045 X-RAY EXAM CHEST 1 VIEW: CPT | Mod: 26

## 2021-05-16 PROCEDURE — C1889: CPT

## 2021-05-16 RX ORDER — BENZOCAINE AND MENTHOL 5; 1 G/100ML; G/100ML
1 LIQUID ORAL
Refills: 0 | Status: DISCONTINUED | OUTPATIENT
Start: 2021-05-16 | End: 2021-05-16

## 2021-05-16 RX ORDER — ISOSORBIDE MONONITRATE 60 MG/1
30 TABLET, EXTENDED RELEASE ORAL DAILY
Refills: 0 | Status: DISCONTINUED | OUTPATIENT
Start: 2021-05-16 | End: 2021-05-16

## 2021-05-16 RX ORDER — POTASSIUM CHLORIDE 20 MEQ
20 PACKET (EA) ORAL ONCE
Refills: 0 | Status: COMPLETED | OUTPATIENT
Start: 2021-05-16 | End: 2021-05-16

## 2021-05-16 RX ADMIN — CLOPIDOGREL BISULFATE 75 MILLIGRAM(S): 75 TABLET, FILM COATED ORAL at 11:10

## 2021-05-16 RX ADMIN — ISOSORBIDE MONONITRATE 30 MILLIGRAM(S): 60 TABLET, EXTENDED RELEASE ORAL at 11:31

## 2021-05-16 RX ADMIN — PANTOPRAZOLE SODIUM 40 MILLIGRAM(S): 20 TABLET, DELAYED RELEASE ORAL at 06:02

## 2021-05-16 RX ADMIN — Medication 20 MILLIEQUIVALENT(S): at 07:56

## 2021-05-16 RX ADMIN — Medication 2: at 11:39

## 2021-05-16 RX ADMIN — BENZOCAINE AND MENTHOL 1 LOZENGE: 5; 1 LIQUID ORAL at 08:15

## 2021-05-16 RX ADMIN — HEPARIN SODIUM 5000 UNIT(S): 5000 INJECTION INTRAVENOUS; SUBCUTANEOUS at 06:03

## 2021-05-16 RX ADMIN — Medication 81 MILLIGRAM(S): at 11:10

## 2021-05-16 RX ADMIN — LIDOCAINE 1 PATCH: 4 CREAM TOPICAL at 11:11

## 2021-05-16 RX ADMIN — BENZOCAINE AND MENTHOL 1 LOZENGE: 5; 1 LIQUID ORAL at 11:11

## 2021-05-16 NOTE — DISCHARGE NOTE NURSING/CASE MANAGEMENT/SOCIAL WORK - PATIENT PORTAL LINK FT
You can access the FollowMyHealth Patient Portal offered by [facility] by registering at the following website: [email or web address]. By joining [facility]’s FollowMyHealth portal, you will also be able to view your health information using other applications (apps) compatible with our system.

## 2021-05-16 NOTE — PROGRESS NOTE ADULT - ASSESSMENT
Mr. Donis is a 76 year old male, Cantonese speaking, former smoker, with a PMH of HLD, DM, PAD, CAD s/p stents 2006 and 2009, who was referred by PCP Dr. Son Ibarra to Dr. Paul for evaluation of RUL lung nodule found on lung cancer screening CT. He presented to [facility] on 5/14/21 for elective RVATS, robotic assisted, right upper lobe segmentectomy and mediastinal lymph node dissection. Surgical pathology is pending result. He recovered and PACU immediately postop with one right pleural CT to suction and no air leak. CXR showed right apical air space about 1cm. On POD 1, CT placed to waterseal. CXR with right apex airspace appeared slightly larger on repeat CXR. Chest XR was repeated and air space was stable. Right chest tube was removed. Follow up chest XR showed stable right apical air space.  On POD2, morning chest xr was stable. Hemoptysis now decreased. He does have a hoarse voice, but he is tolerating a PO diet with no difficulty. Benicar and Imdur resumed. He was cleared for discharge home and will follow up with Dr. Paul as an outpatient.    Neurovascular:   - IV tylenol/morphine PRN pain     Cardiovascular:   - CAD: with previously stent placement: continue aspirin, f/u when to restart plavix   - HLD: continue statin   - Monitor hr/bp/tele    Respiratory:   - POD 0 RUL segmentectomy and MLND 2/2 lung nodule  - CT to suction no AL minimal drainage  - CXR showed CT in place, small apical ptx  - Former smoker, wean O2 as tolerated   - Daily CXR  - Encourage C+DB and Use of IS 10x / hr while awake.    GI: Stable.  -ADAT  -PPX.  -Bowel regimen    Renal / : 9/0.99  -Monitor renal function.  -Monitor I/O's.  -Replete lytes PRN     Endocrine: DMT2 on oral meds   - pending A1c, continue ISS  - no hx thyroid disease    Hematologic: H/H 12/37  - H&H stable, continue to monitor  - DVT ppx: SQH 5000 TID, SCDs b/l     ID: afebrile and WBC 10  - Complete carmelina-op abx  - Observe for SIRS/Sepsis Syndrome.    Disposition:  -Home when medically ready Mr. Donis is a 76 year old male, Cantonese speaking, former smoker, with a PMH of HLD, DM, PAD, CAD s/p stents 2006 and 2009, who was referred by PCP Dr. Son Ibarra to Dr. Paul for evaluation of RUL lung nodule found on lung cancer screening CT. He presented to [facility] on 5/14/21 for elective RVATS, robotic assisted, right upper lobe segmentectomy and mediastinal lymph node dissection. Surgical pathology is pending result. He recovered and PACU immediately postop with one right pleural CT to suction and no air leak. CXR showed right apical air space about 1cm. On POD 1, CT placed to waterseal. CXR with right apex airspace appeared slightly larger on repeat CXR. Chest XR was repeated and air space was stable. Right chest tube was removed. Follow up chest XR showed stable right apical air space.  On POD2, morning chest xr was stable. Hemoptysis now decreased. He does have a hoarse voice, but he is tolerating a PO diet with no difficulty. Benicar and Imdur resumed. He was cleared for discharge home and will follow up with Dr. Paul as an outpatient.    Neurovascular:   - IV tylenol/morphine PRN pain     Cardiovascular:   - CAD: with previously stent placement: continue aspirin and Plavix  - Benicar and Imdur resumed  - HLD: continue statin   - Monitor hr/bp/tele    Respiratory:   - POD 2 RUL segmentectomy and MLND 2/2 lung nodule  - CT removed  - CXR small apical ptx, stable  - Former smoker,   - On RA  - Hemoptysis improving  - Some hoarseness, improving. Tolerating PO diet    GI: Stable.  -ADAT  -PPX.  -Bowel regimen    Renal / :  -Monitor renal function.  -Monitor I/O's.  -Replete lytes PRN     Endocrine:   - DMT2 on oral meds   - A1c 7, resume home meds on DC.    Hematologic:   - H&H stable  - DVT ppx: SQH 5000 TID, SCDs b/l     ID:  - afebrile and WBC 11.   - T 100.8 via temporal and PO 99.8. Encouraged ISS  - Complete carmelina-op abx  - Observe for SIRS/Sepsis Syndrome.    Disposition:  -Home today

## 2021-05-16 NOTE — PROGRESS NOTE ADULT - SUBJECTIVE AND OBJECTIVE BOX
Patient discussed on morning rounds with Dr. Mcdonald    Operation / Date: 5/14/21 RVATS, RA, RUL segmentectomy MLND    SUBJECTIVE ASSESSMENT:  76y Male seen and examined this AM. Chest tube put to waterseal. No complaints.        Vital Signs Last 24 Hrs  T(C): 37.2 (15 May 2021 13:05), Max: 37.7 (15 May 2021 05:13)  T(F): 98.9 (15 May 2021 13:05), Max: 99.8 (15 May 2021 05:13)  HR: 71 (15 May 2021 14:16) (71 - 92)  BP: 144/63 (15 May 2021 14:16) (108/51 - 147/67)  BP(mean): 90 (15 May 2021 14:16) (74 - 96)  RR: 21 (15 May 2021 14:16) (15 - 24)  SpO2: 95% (15 May 2021 14:16) (93% - 98%)  I&O's Detail    14 May 2021 07:01  -  15 May 2021 07:00  --------------------------------------------------------  IN:    Lactated Ringers: 1000 mL    Oral Fluid: 240 mL  Total IN: 1240 mL    OUT:    Chest Tube (mL): 240 mL    Voided (mL): 1700 mL  Total OUT: 1940 mL    Total NET: -700 mL          CHEST TUBE:  Yes. AIR LEAKS: No. H2O SEAL.   TIE DOWNS: Yes.  ORDONEZ: No.    PHYSICAL EXAM:  Appearance: No acute distress.  Neurologic: AAOx3, no AMS or focal deficits.  Responds appropriately to verbal and physical stimuli; exhibits purposeful movement in all extremities.  Cardiovascular: RRR  Respiratory: CTAB  Gastrointestinal:  Soft, non-tender, non-distended, + BS.	  Extremities: warm, well perfused. No edema  Incision Sites: VATS incisions CDI    LABS:                        12.1   11.92 )-----------( 141      ( 15 May 2021 05:42 )             36.5         PT/INR - ( 14 May 2021 11:25 )   PT: 12.4 sec;   INR: 1.04          PTT - ( 14 May 2021 11:25 )  PTT:27.6 sec    05-15    140  |  107  |  18  ----------------------------<  122<H>  3.8   |  22  |  0.99    Ca    9.0      15 May 2021 05:42  Mg     2.2     05-15            MEDICATIONS  (STANDING):  aspirin  chewable 81 milliGRAM(s) Oral daily  atorvastatin 20 milliGRAM(s) Oral at bedtime  ceFAZolin  Injectable. 2000 milliGRAM(s) IV Push every 8 hours  dextrose 40% Gel 15 Gram(s) Oral once  dextrose 5%. 1000 milliLiter(s) (50 mL/Hr) IV Continuous <Continuous>  dextrose 5%. 1000 milliLiter(s) (100 mL/Hr) IV Continuous <Continuous>  dextrose 50% Injectable 25 Gram(s) IV Push once  dextrose 50% Injectable 12.5 Gram(s) IV Push once  dextrose 50% Injectable 25 Gram(s) IV Push once  glucagon  Injectable 1 milliGRAM(s) IntraMuscular once  heparin   Injectable 5000 Unit(s) SubCutaneous every 8 hours  insulin lispro (ADMELOG) corrective regimen sliding scale   SubCutaneous Before meals and at bedtime  lactated ringers. 1000 milliLiter(s) (50 mL/Hr) IV Continuous <Continuous>  lidocaine   Patch 1 Patch Transdermal daily  pantoprazole    Tablet 40 milliGRAM(s) Oral before breakfast  senna 2 Tablet(s) Oral at bedtime    MEDICATIONS  (PRN):  acetaminophen   Tablet .. 650 milliGRAM(s) Oral every 6 hours PRN Temp greater or equal to 38C (100.4F), Mild Pain (1 - 3)  oxyCODONE    IR 5 milliGRAM(s) Oral every 6 hours PRN Severe Pain (7 - 10)        RADIOLOGY & ADDITIONAL TESTS:    
Patient discussed on morning rounds with Dr. Small    Operation / Date: 5/14/21 RVATS, RA, RUL segmentectomy    Surgeon: Humberto    Referring Physician: Dr. Son Ibarra    SUBJECTIVE ASSESSMENT AND HOSPITAL COURSE:  76y Male seen and examined. Feeling well. Hemopytsis improved. Home today    Mr. Donis is a 76 year old male, Cantonese speaking, former smoker, with a PMH of HLD, DM, PAD, CAD s/p stents 2006 and 2009, who was referred by PCP Dr. Son Ibarra to Dr. Paul for evaluation of RUL lung nodule found on lung cancer screening CT. He presented to [facility] on 5/14/21 for elective RVATS, robotic assisted, right upper lobe segmentectomy and mediastinal lymph node dissection. Surgical pathology is pending result. He recovered and PACU immediately postop with one right pleural CT to suction and no air leak. CXR showed right apical air space about 1cm. On POD 1, CT placed to waterseal. CXR with right apex airspace appeared slightly larger on repeat CXR. Chest XR was repeated and air space was stable. Right chest tube was removed. Follow up chest XR showed stable right apical air space.  On POD2, morning chest xr was stable. Hemoptysis now decreased. He does have a hoarse voice, but he is tolerating a PO diet with no difficulty. Benicar and Imdur resumed. He was cleared for discharge home and will follow up with Dr. Paul as an outpatient.      Vital Signs Last 24 Hrs  T(C): 36.6 (16 May 2021 09:12), Max: 38.2 (16 May 2021 05:01)  T(F): 97.9 (16 May 2021 09:12), Max: 100.8 (16 May 2021 05:01)  HR: 74 (16 May 2021 08:26) (69 - 76)  BP: 129/62 (16 May 2021 08:26) (122/58 - 152/64)  BP(mean): 87 (16 May 2021 08:26) (83 - 97)  RR: 23 (16 May 2021 08:26) (17 - 25)  SpO2: 93% (16 May 2021 08:26) (93% - 97%)    TIE DOWNS REMOVED: No.    PHYSICAL EXAM:  Appearance: No acute distress.  Neurologic: AAOx3, no AMS or focal deficits.  Responds appropriately to verbal and physical stimuli; exhibits purposeful movement in all extremities.  Cardiovascular: RRR  Respiratory: CTAB  Gastrointestinal:  Soft, non-tender, non-distended, + BS.	  Extremities: warm well perfused  Incisions: VATS incisions CDI      LABS:                        11.9   11.54 )-----------( 130      ( 16 May 2021 05:42 )             35.5       PT/INR - ( 14 May 2021 11:25 )   PT: 12.4 sec;   INR: 1.04          PTT - ( 14 May 2021 11:25 )  PTT:27.6 sec    05-16    138  |  102  |  22  ----------------------------<  146<H>  3.8   |  25  |  1.05    Ca    8.9      16 May 2021 05:42  Mg     2.1     05-16            Discharge CXR:    < from: Xray Chest 1 View- PORTABLE-Routine (Xray Chest 1 View- PORTABLE-Routine in AM.) (05.16.21 @ 08:14) >  Frontal examination the chest demonstrates no interval change right lung infiltrates in comparison to prior examination of the chest 5/15/2020. Right apical pneumothorax noted. Right effusion. Subcutaneous emphysema noted overlying right lower lateral chest wall. General changes thoracic spine. Calcification aortic knob    IMPRESSION: No interval change right lung infiltrates. Right apical pneumothorax    < end of copied text >      
Patient discussed on morning rounds with Dr. Paul    Operation / Date: R VATS RA RUL segmentectomy MLND    SUBJECTIVE ASSESSMENT:  76y Male seen and examined in the PACU, Denies having pain at this time, offers no new complaints        Vital Signs Last 24 Hrs  T(C): 36.4 (14 May 2021 10:29), Max: 36.4 (14 May 2021 10:29)  T(F): 97.6 (14 May 2021 10:29), Max: 97.6 (14 May 2021 10:29)  HR: 72 (14 May 2021 14:08) (68 - 74)  BP: 129/62 (14 May 2021 14:08) (128/58 - 150/68)  BP(mean): 87 (14 May 2021 13:30) (84 - 98)  RR: 17 (14 May 2021 14:08) (9 - 17)  SpO2: 98% (14 May 2021 14:08) (94% - 99%)  I&O's Detail    14 May 2021 07:01  -  14 May 2021 14:43  --------------------------------------------------------  IN:    Lactated Ringers: 150 mL  Total IN: 150 mL    OUT:    Chest Tube (mL): 30 mL  Total OUT: 30 mL    Total NET: 120 mL          CHEST TUBE:  Yes. AIR LEAKS: No. Suction -20  NOBLE DRAIN: No.  EPICARDIAL WIRES: No.  TIE DOWNS: Yes.  ORDONEZ: no.    PHYSICAL EXAM:  General: Lying in bed sleeping NAD  HEENT: NCAT MMM EOMI neck supple   Neurological: A&O x3, no focal deficits  Cardiovascular: RRR, normal s1 s2, no M/R/G  Respiratory: Non-labored breathing,  CTA b/l  Gastrointestinal: soft, non-tender to palpation, non-distended  Extremities: WWP, no pitting edema, no calf tenderness  Vascular: 2+radial pulses b/l, 2+DP pulses b/l  Incision: R VATS incisions CD&I no drainage no erythema    LABS:                        12.4   10.44 )-----------( 142      ( 14 May 2021 11:24 )             37.7       COUMADIN:  no    PT/INR - ( 14 May 2021 11:25 )   PT: 12.4 sec;   INR: 1.04          PTT - ( 14 May 2021 11:25 )  PTT:27.6 sec    05-14    138  |  104  |  24<H>  ----------------------------<  206<H>  4.3   |  25  |  0.99    Ca    8.8      14 May 2021 11:24            MEDICATIONS  (STANDING):  acetaminophen  IVPB .. 1000 milliGRAM(s) IV Intermittent once  aspirin  chewable 81 milliGRAM(s) Oral daily  atorvastatin 20 milliGRAM(s) Oral at bedtime  ceFAZolin  Injectable. 2000 milliGRAM(s) IV Push every 8 hours  dextrose 40% Gel 15 Gram(s) Oral once  dextrose 5%. 1000 milliLiter(s) (50 mL/Hr) IV Continuous <Continuous>  dextrose 5%. 1000 milliLiter(s) (100 mL/Hr) IV Continuous <Continuous>  dextrose 50% Injectable 25 Gram(s) IV Push once  dextrose 50% Injectable 12.5 Gram(s) IV Push once  dextrose 50% Injectable 25 Gram(s) IV Push once  glucagon  Injectable 1 milliGRAM(s) IntraMuscular once  heparin   Injectable 5000 Unit(s) SubCutaneous every 8 hours  insulin lispro (ADMELOG) corrective regimen sliding scale   SubCutaneous Before meals and at bedtime  lactated ringers. 1000 milliLiter(s) (50 mL/Hr) IV Continuous <Continuous>  lidocaine   Patch 1 Patch Transdermal daily  pantoprazole    Tablet 40 milliGRAM(s) Oral before breakfast    MEDICATIONS  (PRN):  morphine  - Injectable 2 milliGRAM(s) IV Push every 6 hours PRN Severe Pain (7 - 10)        RADIOLOGY & ADDITIONAL TESTS:    CXR: R CT in place, small apical ptx

## 2021-05-18 PROBLEM — I25.10 ATHEROSCLEROTIC HEART DISEASE OF NATIVE CORONARY ARTERY WITHOUT ANGINA PECTORIS: Chronic | Status: ACTIVE | Noted: 2021-05-13

## 2021-05-18 PROBLEM — E11.9 TYPE 2 DIABETES MELLITUS WITHOUT COMPLICATIONS: Chronic | Status: ACTIVE | Noted: 2021-05-13

## 2021-05-18 PROBLEM — K76.0 FATTY (CHANGE OF) LIVER, NOT ELSEWHERE CLASSIFIED: Chronic | Status: ACTIVE | Noted: 2021-05-13

## 2021-05-19 LAB — SURGICAL PATHOLOGY STUDY: SIGNIFICANT CHANGE UP

## 2021-05-21 DIAGNOSIS — K76.0 FATTY (CHANGE OF) LIVER, NOT ELSEWHERE CLASSIFIED: ICD-10-CM

## 2021-05-21 DIAGNOSIS — Z79.82 LONG TERM (CURRENT) USE OF ASPIRIN: ICD-10-CM

## 2021-05-21 DIAGNOSIS — R04.2 HEMOPTYSIS: ICD-10-CM

## 2021-05-21 DIAGNOSIS — Z87.891 PERSONAL HISTORY OF NICOTINE DEPENDENCE: ICD-10-CM

## 2021-05-21 DIAGNOSIS — I10 ESSENTIAL (PRIMARY) HYPERTENSION: ICD-10-CM

## 2021-05-21 DIAGNOSIS — R91.1 SOLITARY PULMONARY NODULE: ICD-10-CM

## 2021-05-21 DIAGNOSIS — Z95.5 PRESENCE OF CORONARY ANGIOPLASTY IMPLANT AND GRAFT: ICD-10-CM

## 2021-05-21 DIAGNOSIS — Z79.84 LONG TERM (CURRENT) USE OF ORAL HYPOGLYCEMIC DRUGS: ICD-10-CM

## 2021-05-21 DIAGNOSIS — E11.9 TYPE 2 DIABETES MELLITUS WITHOUT COMPLICATIONS: ICD-10-CM

## 2021-05-21 DIAGNOSIS — E78.5 HYPERLIPIDEMIA, UNSPECIFIED: ICD-10-CM

## 2021-05-21 DIAGNOSIS — I25.10 ATHEROSCLEROTIC HEART DISEASE OF NATIVE CORONARY ARTERY WITHOUT ANGINA PECTORIS: ICD-10-CM

## 2021-05-21 DIAGNOSIS — Z79.02 LONG TERM (CURRENT) USE OF ANTITHROMBOTICS/ANTIPLATELETS: ICD-10-CM

## 2021-05-21 DIAGNOSIS — J93.9 PNEUMOTHORAX, UNSPECIFIED: ICD-10-CM

## 2021-05-28 ENCOUNTER — APPOINTMENT (OUTPATIENT)
Dept: THORACIC SURGERY | Facility: CLINIC | Age: 76
End: 2021-05-28
Payer: MEDICARE

## 2021-05-28 ENCOUNTER — OUTPATIENT (OUTPATIENT)
Dept: OUTPATIENT SERVICES | Facility: HOSPITAL | Age: 76
LOS: 1 days | End: 2021-05-28
Payer: COMMERCIAL

## 2021-05-28 DIAGNOSIS — Z41.9 ENCOUNTER FOR PROCEDURE FOR PURPOSES OTHER THAN REMEDYING HEALTH STATE, UNSPECIFIED: Chronic | ICD-10-CM

## 2021-05-28 PROCEDURE — 99024 POSTOP FOLLOW-UP VISIT: CPT

## 2021-05-28 PROCEDURE — 71046 X-RAY EXAM CHEST 2 VIEWS: CPT

## 2021-05-28 PROCEDURE — 71046 X-RAY EXAM CHEST 2 VIEWS: CPT | Mod: 26

## 2021-07-09 ENCOUNTER — OUTPATIENT (OUTPATIENT)
Dept: OUTPATIENT SERVICES | Facility: HOSPITAL | Age: 76
LOS: 1 days | End: 2021-07-09
Payer: COMMERCIAL

## 2021-07-09 ENCOUNTER — APPOINTMENT (OUTPATIENT)
Dept: THORACIC SURGERY | Facility: CLINIC | Age: 76
End: 2021-07-09
Payer: MEDICARE

## 2021-07-09 VITALS
SYSTOLIC BLOOD PRESSURE: 150 MMHG | RESPIRATION RATE: 17 BRPM | BODY MASS INDEX: 26.03 KG/M2 | TEMPERATURE: 98.1 F | HEART RATE: 70 BPM | OXYGEN SATURATION: 96 % | WEIGHT: 162 LBS | DIASTOLIC BLOOD PRESSURE: 66 MMHG | HEIGHT: 66 IN

## 2021-07-09 DIAGNOSIS — Z41.9 ENCOUNTER FOR PROCEDURE FOR PURPOSES OTHER THAN REMEDYING HEALTH STATE, UNSPECIFIED: Chronic | ICD-10-CM

## 2021-07-09 DIAGNOSIS — Z09 ENCOUNTER FOR FOLLOW-UP EXAMINATION AFTER COMPLETED TREATMENT FOR CONDITIONS OTHER THAN MALIGNANT NEOPLASM: ICD-10-CM

## 2021-07-09 PROCEDURE — 99024 POSTOP FOLLOW-UP VISIT: CPT

## 2021-07-09 PROCEDURE — 71046 X-RAY EXAM CHEST 2 VIEWS: CPT | Mod: 26

## 2021-07-09 PROCEDURE — 71046 X-RAY EXAM CHEST 2 VIEWS: CPT

## 2021-11-02 NOTE — REASON FOR VISIT
Vitals:    08/13/20 1432   BP: (!) 132/100   Pulse: 94     Chay is here today for a nurse visit blood pressure check. After sitting for 10 minutes I took his vitals.  Takes amlodipine 5 mg 1 tab daily and lisinopril 40 mg 1 tab daily.      He states that he is stressed due to family complications.     Please advise.      [Follow-Up: _____] : a [unfilled] follow-up visit

## 2021-11-05 ENCOUNTER — APPOINTMENT (OUTPATIENT)
Dept: THORACIC SURGERY | Facility: CLINIC | Age: 76
End: 2021-11-05
Payer: MEDICARE

## 2021-11-05 VITALS
WEIGHT: 163 LBS | RESPIRATION RATE: 17 BRPM | TEMPERATURE: 97.4 F | HEART RATE: 74 BPM | HEIGHT: 66 IN | BODY MASS INDEX: 26.2 KG/M2 | DIASTOLIC BLOOD PRESSURE: 68 MMHG | SYSTOLIC BLOOD PRESSURE: 151 MMHG | OXYGEN SATURATION: 95 %

## 2021-11-05 PROCEDURE — 99213 OFFICE O/P EST LOW 20 MIN: CPT

## 2021-11-09 NOTE — END OF VISIT
[FreeTextEntry3] : I, JASIEL JUAN , am scribing for and in the presence of NITHIN ZARCO the following sections: history of present illness, past medical/family/surgical/family/social history, review of systems, vital signs, physical exam, and disposition.\par \par

## 2021-11-09 NOTE — ASSESSMENT
[FreeTextEntry1] : 76 year old year s/p RVATS, robotic assisted, right upper lobe posterior segmentectomy, MLND on 5/14/21 with pathology revealing pTmi, pN0, minimally invasive adenocarcinoma, 0/9 lymph nodes involved. Per NCCN NSCLC guidelines, no role for adjuvant chemotherapy at this time.\par \par Today, patient presents for follow up visit with CT chest:\par CT chest completed on 11/1/21:\par - s/p RUL wedge resection with postsurgical changes. Small right pleural effusion.\par - left upper lobe 2 mm lung nodules, stable\par \par The patient reports feeling well. Denies cough, SOB pain, fever, hemoptysis and unintentional weight loss. Will plan for CT chest in 6 months for continued surveillance. \par \par I have reviewed the patient's medical records and diagnostic images at the time of this office consultation and have made the following recommendation.\par \par Plan:\par 1. CT Chest in 6 months \par

## 2021-11-09 NOTE — HISTORY OF PRESENT ILLNESS
[FreeTextEntry1] : 76 year old male, Cantonese speaking, former smoker, PMHx, HLD, DM, PAD< CAD s/p stents 2006, 2009, referred by PCP Dr. Son Ibarra for evaluation of lung nodule found on lung cancer screening CT. Now s/p Right video assisted thoracic surgery, robotic- assisted, Right upper lobe posterior segmentectomy, Mediastinal lymph node dissection on 5/14/21 with Pathology revealing  PTmi, pN0, minimally invasive adenocarcinoma, 0/9 lymph nodes involved. He presents today to review CT chest . \par \par CT Chest 12/20/19\par - Stable RUL groundglass nodule dating back to 2015. Continued annual follow up is recommended. \par - Additional stable subcentimeter pulmonary nodules. \par - No new nodule is identified.\par \par CT Chest 4/23/21\par - Persistent 1.2cm subsolid ground-glass nodular opacity in the RUL. The overall size of the lesion is not significantly changed dating back to 2015. However, a developing small solid component (0.3cm) and increased density are noted within the lesion. This raises suspicious for a malignant process in the adenocarcinomatous spectrum. Histological correlation should be considered. \par - Additional subcentimeter nodules are stable. \par - Thyromegaly.\par - Hepatic steatosis. \par \par CT chest completed on 11/1/21:\par - s/p RUL wedge resection with postsurgical changes. Small right pleural effusion.\par - left upper lobe 2 mm lung nodules, stable\par

## 2022-05-11 ENCOUNTER — APPOINTMENT (OUTPATIENT)
Dept: THORACIC SURGERY | Facility: CLINIC | Age: 77
End: 2022-05-11
Payer: MEDICARE

## 2022-05-11 PROCEDURE — 99442: CPT

## 2022-05-11 NOTE — HISTORY OF PRESENT ILLNESS
[FreeTextEntry1] : 76 year old male, Cantonese speaking, former smoker, PMHx, HLD, DM, PAD, CAD s/p stents 2006, 2009, referred by PCP Dr. Son Ibarra for evaluation of lung nodule found on lung cancer screening CT. Now s/p Right video assisted thoracic surgery, robotic- assisted, Right upper lobe posterior segmentectomy, Mediastinal lymph node dissection on 5/14/21, with Pathology revealing PTmi, pN0, minimally invasive adenocarcinoma, 0/9 lymph nodes involved. He presents today to review surveillance CT chest . \par \par CT Chest 12/20/19\par - Stable RUL groundglass nodule dating back to 2015. Continued annual follow up is recommended. \par - Additional stable subcentimeter pulmonary nodules. \par - No new nodule is identified.\par \par CT Chest 4/23/21\par - Persistent 1.2cm subsolid ground-glass nodular opacity in the RUL. The overall size of the lesion is not significantly changed dating back to 2015. However, a developing small solid component (0.3cm) and increased density are noted within the lesion. This raises suspicious for a malignant process in the adenocarcinomatous spectrum. Histological correlation should be considered. \par - Additional subcentimeter nodules are stable. \par - Thyromegaly.\par - Hepatic steatosis. \par \par CT chest completed on 11/1/21:\par - s/p RUL wedge resection with postsurgical changes. Small right pleural effusion.\par - left upper lobe 2 mm lung nodules, stable\par \par CT chest on 05/08/2022\par - wedge resection of te posterior RUL. \par - right pleural effusion has decreased in amount since 11/21. \par - stable subpleural left upper lobe nodule 2-3 mm since 07/2015\par - coronary artery disease.

## 2022-05-11 NOTE — ASSESSMENT
[FreeTextEntry1] : 77 year old male, Cantonese speaking, former smoker (quit 2019), PMHx, HLD, DM, PAD, CAD s/p stents 2006, 2009, referred by PCP Dr. Son Ibarra for evaluation of lung nodule found on lung cancer screening CT.  He is now one year s/p RVATS, robotic assisted, right upper lobe posterior segmentectomy, MLND on 5/14/21 with pathology revealing pTmi, pN0, minimally invasive adenocarcinoma, 0/9 lymph nodes involved. \par \par Today, patient presents for follow up visit with CT chest. \par \par CT chest on 05/08/2022\par - wedge resection of te posterior RUL. \par - right pleural effusion has decreased in amount since 11/21. \par - stable subpleural left upper lobe nodule 2-3 mm since 07/2015\par - coronary artery disease.\par \par The patient reports feeling well. Denies cough, SOB pain, fever, hemoptysis and unintentional weight loss. I reviewed and discussed the recent CT results with the pt: stable post-op changes, improving pleural effusion,and stable HOWARD nodule. I confirmed with the patient that here's no role of adjuvant chemoradiation as needed per NCCN guideline. He can follow up PCP for chronic issues. He's suggested to life a smoking-free life, and routine exercise at least 150 min per week. \par \par Plan:\par 1. CT Chest in 6 months \par \par

## 2022-11-14 ENCOUNTER — APPOINTMENT (OUTPATIENT)
Dept: THORACIC SURGERY | Facility: CLINIC | Age: 77
End: 2022-11-14

## 2022-11-14 DIAGNOSIS — R91.1 SOLITARY PULMONARY NODULE: ICD-10-CM

## 2022-11-14 PROCEDURE — 99442: CPT

## 2022-11-14 NOTE — REASON FOR VISIT
[Follow-Up: _____] : a [unfilled] follow-up visit [Home] : at home, [unfilled] , at the time of the visit. [Family Member] : family member [Verbal consent obtained from patient] : the patient, [unfilled]

## 2022-11-14 NOTE — DATA REVIEWED
[FreeTextEntry1] : \par CT Chest 12/20/19\par - Stable RUL groundglass nodule dating back to 2015. Continued annual follow up is recommended. \par - Additional stable subcentimeter pulmonary nodules. \par - No new nodule is identified.\par \par CT Chest 4/23/21\par - Persistent 1.2cm subsolid ground-glass nodular opacity in the RUL. The overall size of the lesion is not significantly changed dating back to 2015. However, a developing small solid component (0.3cm) and increased density are noted within the lesion. This raises suspicious for a malignant process in the adenocarcinomatous spectrum. Histological correlation should be considered. \par - Additional subcentimeter nodules are stable. \par - Thyromegaly.\par - Hepatic steatosis. \par \par CT chest completed on 11/1/21:\par - s/p RUL wedge resection with postsurgical changes. Small right pleural effusion.\par - left upper lobe 2 mm lung nodules, stable\par \par CT chest on 05/08/2022\par - wedge resection of te posterior RUL. \par - right pleural effusion has decreased in amount since 11/21. \par - stable subpleural left upper lobe nodule 2-3 mm since 07/2015\par - coronary artery disease.

## 2022-11-14 NOTE — HISTORY OF PRESENT ILLNESS
[FreeTextEntry1] : 77 year old male, Cantonese speaking, former smoker, PMHx, HLD, DM, PAD, CAD s/p stents 2006, 2009, referred by PCP Dr. Son Ibarra for evaluation of lung nodule found on lung cancer screening CT. \par \par On 05/14/2021, he underwent Right video assisted thoracic surgery, robotic- assisted, Right upper lobe posterior segmentectomy, Mediastinal lymph node dissection, with Pathology revealing PTmi, pN0, minimally invasive adenocarcinoma, 0/9 lymph nodes involved. \par \par He presents today to review surveillance CT chest, reports coughing with mild white phlegm, no chest pain or shortness of breath. \par \par CT chest on 11/08/2022\par - wedge rx of RUL\par - Interval worsening in a right pleural effusion, still small\par - scattered tiny peripheral HOWARD nodules, unchanged since 2015 and therefore likely benign\par - coronary artery calcifications.

## 2022-11-14 NOTE — ASSESSMENT
[FreeTextEntry1] : 77 year old male, Cantonese speaking, former smoker, PMHx, HLD, DM, PAD, CAD s/p stents 2006, 2009, referred by PCP Dr. Son Ibarra for evaluation of lung nodule found on lung cancer screening CT. \par \par On 05/14/2021, he underwent Right video assisted thoracic surgery, robotic- assisted, Right upper lobe posterior segmentectomy, Mediastinal lymph node dissection, with Pathology revealing PTmi, pN0, minimally invasive adenocarcinoma, 0/9 lymph nodes involved. \par \par He presents today for a follow up. CT chest on 11/08/22 was reviewed, there's Interval worsening in a right pleural effusion, however, still smaller than that in 11/2021. Patient admits occasional coughs with mild white phlegm, no chest pain or shortness of breath. I recommended to continue observe with a CT chest in 6 months. Discussed with both the patient and his daughter. \par \par \par Plan: follow up in 6 months with a CT chest without contrast

## 2023-05-19 ENCOUNTER — APPOINTMENT (OUTPATIENT)
Dept: THORACIC SURGERY | Facility: CLINIC | Age: 78
End: 2023-05-19
Payer: MEDICARE

## 2023-05-19 DIAGNOSIS — C34.90 MALIGNANT NEOPLASM OF UNSPECIFIED PART OF UNSPECIFIED BRONCHUS OR LUNG: ICD-10-CM

## 2023-05-19 PROCEDURE — 99443: CPT

## 2023-05-19 NOTE — REASON FOR VISIT
Detail Level: Detailed [Follow-Up: _____] : a [unfilled] follow-up visit [Home] : at home, [unfilled] , at the time of the visit. [Medical Office: ([facility])___] : at the medical office located in

## 2023-05-21 NOTE — HISTORY OF PRESENT ILLNESS
[FreeTextEntry1] : \par 78 year old male, Cantonese speaking, former smoker, PMHx, HLD, DM, PAD, CAD s/p stents 2006, 2009, referred by PCP Dr. Son Ibarra for evaluation of lung nodule found on lung cancer screening CT. \par \par On 05/14/2021, he underwent Right video assisted thoracic surgery, robotic- assisted, Right upper lobe posterior segmentectomy, Mediastinal lymph node dissection, with Pathology revealing PTmi, pN0, minimally invasive adenocarcinoma, 0/9 lymph nodes involved. \par \par CT chest on 11/08/2022\par - wedge rx of RUL\par - Interval worsening in a right pleural effusion, still small\par - scattered tiny peripheral HOWARD nodules, unchanged since 2015 and therefore likely benign\par - coronary artery calcifications. \par \par CT chest on 05/15/2023: \par - post-op changes\par - persistent small right pleural effusion, progressed since the prior of 11/8/2022. \par - adjacent lobulated density measuring 9 x 1.7 cm suggesting rounded atelectasis in the RLL has also increased (3: 76)\par - punctate right pleural calcification, similar. \par - a 0.5 cm pleural based calcification in the posterior left hemithorax is also unchanged. \par - scattered 0.1 1 0.2 cm posterior HOWARD nodules, similar (2: 21 and 24)\par - similar appearance of an enlarged thyroid gland with subcentimeter left thyroid nodule. \par - coronary artery calcifications. \par \par Patient is followed today via Telephonic visit.

## 2023-05-21 NOTE — ASSESSMENT
[FreeTextEntry1] : 78 year old male, Cantonese speaking, former smoker, PMHx, HLD, DM, PAD, CAD s/p stents 2006, 2009, referred by PCP Dr. Son Ibarra for evaluation of lung nodule found on lung cancer screening CT. \par \par On 05/14/2021, he underwent Right video assisted thoracic surgery, robotic- assisted, Right upper lobe posterior segmentectomy, Mediastinal lymph node dissection, with Pathology revealing PTmi, pN0, minimally invasive adenocarcinoma, 0/9 lymph nodes involved. \par \par CT chest on 05/15/2023: \par - post-op changes\par - persistent small right pleural effusion, progressed since the prior of 11/8/2022. \par - adjacent lobulated density measuring 9 x 1.7 cm suggesting rounded atelectasis in the RLL has also increased (3: 76)\par - punctate right pleural calcification, similar. \par - a 0.5 cm pleural based calcification in the posterior left hemithorax is also unchanged. \par - scattered 0.1 1 0.2 cm posterior HOWARD nodules, similar (2: 21 and 24)\par - similar appearance of an enlarged thyroid gland with subcentimeter left thyroid nodule. \par - coronary artery calcifications. \par \par I have reviewed the patient's medical records and diagnostic images at time of this office consultation and have made the following recommendation:\par 1. CT chest reviewed and explained to patient, I recommended patient to return to office in 6 months with CT Chest without contrast. 	\par \par I, NITHIN Mulligan, personally performed the evaluation and management (E/M) services for this established patient who follow up today with an existing condition.  That E/M includes  assessing all new/exacerbated/existing conditions, and establishing a plan of care.  Today, my ACP, RANDY BunchC, was here to observe my evaluation and management services for this existing condition to be followed going forward.

## 2023-11-17 ENCOUNTER — APPOINTMENT (OUTPATIENT)
Dept: THORACIC SURGERY | Facility: CLINIC | Age: 78
End: 2023-11-17

## 2023-11-21 ENCOUNTER — NON-APPOINTMENT (OUTPATIENT)
Age: 78
End: 2023-11-21

## 2023-11-22 ENCOUNTER — APPOINTMENT (OUTPATIENT)
Dept: THORACIC SURGERY | Facility: CLINIC | Age: 78
End: 2023-11-22
Payer: MEDICARE

## 2023-11-22 DIAGNOSIS — R05.8 OTHER SPECIFIED COUGH: ICD-10-CM

## 2023-11-22 PROCEDURE — 99443: CPT

## 2023-11-22 RX ORDER — AMOXICILLIN AND CLAVULANATE POTASSIUM 500; 125 MG/1; MG/1
500-125 TABLET, FILM COATED ORAL TWICE DAILY
Qty: 20 | Refills: 0 | Status: ACTIVE | COMMUNITY
Start: 2023-11-22 | End: 1900-01-01

## 2024-02-21 PROBLEM — J98.11 ATELECTASIS, RIGHT: Status: ACTIVE | Noted: 2023-11-22

## 2024-02-23 ENCOUNTER — APPOINTMENT (OUTPATIENT)
Dept: THORACIC SURGERY | Facility: CLINIC | Age: 79
End: 2024-02-23
Payer: MEDICARE

## 2024-02-23 VITALS
BODY MASS INDEX: 25.23 KG/M2 | WEIGHT: 157 LBS | DIASTOLIC BLOOD PRESSURE: 60 MMHG | RESPIRATION RATE: 18 BRPM | HEART RATE: 70 BPM | OXYGEN SATURATION: 97 % | SYSTOLIC BLOOD PRESSURE: 150 MMHG | TEMPERATURE: 97.4 F | HEIGHT: 66 IN

## 2024-02-23 DIAGNOSIS — J98.11 ATELECTASIS: ICD-10-CM

## 2024-02-23 PROCEDURE — 99213 OFFICE O/P EST LOW 20 MIN: CPT

## 2024-02-26 NOTE — PHYSICAL EXAM
[Respiration, Rhythm And Depth] : normal respiratory rhythm and effort [Exaggerated Use Of Accessory Muscles For Inspiration] : no accessory muscle use [Heart Rate And Rhythm] : heart rate was normal and rhythm regular [Heart Sounds] : normal S1 and S2 [Heart Sounds Gallop] : no gallops [Examination Of The Chest] : the chest was normal in appearance [Chest Visual Inspection Thoracic Asymmetry] : no chest asymmetry [Diminished Respiratory Excursion] : normal chest expansion [Cervical Lymph Nodes Enlarged Posterior Bilaterally] : posterior cervical [Cervical Lymph Nodes Enlarged Anterior Bilaterally] : anterior cervical [Axillary Lymph Nodes Enlarged Bilaterally] : axillary [Supraclavicular Lymph Nodes Enlarged Bilaterally] : supraclavicular [Skin Color & Pigmentation] : normal skin color and pigmentation [] : no rash [Deep Tendon Reflexes (DTR)] : deep tendon reflexes were 2+ and symmetric [Oriented To Time, Place, And Person] : oriented to person, place, and time [Impaired Insight] : insight and judgment were intact [Affect] : the affect was normal

## 2024-02-26 NOTE — CONSULT LETTER
[Dear  ___] : Dear  [unfilled], [Consult Letter:] : I had the pleasure of evaluating your patient, [unfilled]. [Please see my note below.] : Please see my note below. [Consult Closing:] : Thank you very much for allowing me to participate in the care of this patient.  If you have any questions, please do not hesitate to contact me. [Sincerely,] : Sincerely, [FreeTextEntry3] : Patrick Paul MD Professor, Cardiovascular & Thoracic Surgery [facility] [employer] Director of the [facility]  Director of Thoracic Surgery, [address] [place] [address] Phone: [phone] Fax: [phone]

## 2024-02-26 NOTE — HISTORY OF PRESENT ILLNESS
[FreeTextEntry1] : 78 year old male, Cantonese speaking, former smoker, PMHx, HLD, DM, PAD, CAD s/p stents 2006, 2009, referred by PCP Dr. Son Ibarra for evaluation of lung nodule found on lung cancer screening CT.  He is now s/p right VATs, robotic- assisted, right upper lobe posterior segmentectomy, Mediastinal lymph node dissection, with Pathology revealing PTmi, pN0, minimally invasive adenocarcinoma on 05/14/2021. Post-operatively, patient developed right pleural effusion, CT scan for surveillance was recommended. Patient presents today for a follow up visit to review his recent CT scan. Report as follows: CT chest on 02/19/2024: -Status post right upper lobe wedge resection. -Persistent small right pleural effusion with increasing pleural based opacities and volume loss involving the right lower and middle lobes.  -Stable 0.2 cm left upper lobe nodules.    Patient reports that he is at his usual health status. States that his breathing is not "as smooth" as he would like but denies feeling acutely SOB. States he has some SOB w/ activity.  He has started lasix every other day to manage his chronic effusion. He is following with Dr. Murray in Pulmonology.

## 2024-02-26 NOTE — DATA REVIEWED
daily reading. YOUR CHILD'S FEELINGS  ? ? Find ways to spend time with your child. ?? If you are concerned that your child is sad, depressed, nervous, irritable, hopeless, or angry, let us know. ?? Talk with your child about how his body is changing during puberty. ?? If you have questions about your childs sexual development, you can always talk with us. HEALTHY BEHAVIOR CHOICES  ?? Help your child find fun, safe things to do. ?? Make sure your child knows how you feel about alcohol and drug use. ?? Know your childs friends and their parents. Be aware of where your child is and what he is doing at all times. ?? Lock your liquor in a cabinet. ?? Store prescription medications in a locked cabinet. ?? Talk with your child about relationships, sex, and values. ?? If you are uncomfortable talking about puberty or sexual pressures with your child, please ask us or others you trust for reliable information that can help. ?? Use clear and consistent rules and discipline with your child. ?? Be a role model. SAFETY  ? ? Make sure everyone always wears a lap and shoulder seat belt in the car. ?? Provide a properly fitting helmet and safety gear for biking, skating, in-line skating, skiing, snowmobiling, and horseback riding. ?? Use a hat, sun protection clothing, and sunscreen with SPF of 15 or higher on her exposed skin. Limit time outside when the sun is strongest (11:00 am-3:00 pm). ?? Dont allow your child to ride ATVs.  ?? Make sure your child knows how to get help if she feels unsafe. ?? If it is necessary to keep a gun in your home, store it unloaded and locked with the ammunition locked separately from the gun. Helpful Resources: Family Media Use Plan: www.healthychildren. org/MediaUsePlan    Consistent with Bright Futures: Guidelines for Health Supervision  of Infants, Children, and Adolescents, 4th Edition  For more information, go to https://brightfutures. aap.org. [FreeTextEntry1] : CT chest on 11/14/2023 - Status post right upper lobe wedge resection. - Persistent small right pleural effusion which now appears slightly loculated anteriorly. Redemonstration of peripheral opacity in the right lower lobe, similar to the May 2023 CT and may represent rounded atelectasis. New additional small area of peripheral atelectasis is noted in the right middle lobe. Continued CT follow-up is recommended.  CT chest on 05/15/2023: - post-op changes - persistent small right pleural effusion, progressed since the prior of 11/8/2022. - adjacent lobulated density measuring 9 x 1.7 cm suggesting rounded atelectasis in the RLL has also increased (3: 76) - punctate right pleural calcification, similar. - a 0.5 cm pleural based calcification in the posterior left hemithorax is also unchanged. - scattered 0.1 1 0.2 cm posterior HOWARD nodules, similar (2: 21 and 24) - similar appearance of an enlarged thyroid gland with subcentimeter left thyroid nodule. - coronary artery calcifications.  CT chest on 11/08/2022 - wedge rx of RUL - Interval worsening in a right pleural effusion, still small - scattered tiny peripheral HOWARD nodules, unchanged since 2015 and therefore likely benign - coronary artery calcifications.

## 2024-02-26 NOTE — ASSESSMENT
[FreeTextEntry1] : 78 year old male, Cantonese speaking, former smoker, PMHx, HLD, DM, PAD, CAD s/p stents 2006, 2009, referred by PCP Dr. Son Ibarra for evaluation of lung nodule found on lung cancer screening CT.  He is now s/p right VATs, robotic- assisted, right upper lobe posterior segmentectomy, Mediastinal lymph node dissection, with Pathology revealing PTmi, pN0, minimally invasive adenocarcinoma on 05/14/2021. Post-operatively, patient developed right pleural effusion, CT scan for surveillance was recommended. Patient presents today for a follow up visit to review his recent CT scan.    I have independently reviewed the medical records and imaging at the time of this office consultation, and discussed the following interpretations with the patient:  CT chest shows a persistent small right pleural effusion with increasing pleural based opacities and volume loss involving the right lower and middle lobes. Other lung nodules are stable. Will obtain a pet/ct to evaluate for any abnormal activity not noted on CT.  Will call patient with results.  Additionally, patient requested a referral to pulmonology. Will provide info for Dr. Naveen Molina.  Plan: -PET CT -Phone call to review   I, Dr. Patrick Paul MD, personally performed the evaluation and management (E/M) services for this established patient who presents today with (a) new problem(s)/exacerbation of (an) existing condition(s).  That E/M includes conducting the clinically appropriate interval history &/or exam, assessing all new/exacerbated conditions, and establishing a new plan of care.  Today, my MARIANA, Beth Duke NP, was here to observe &/or participate in the visit & follow plan of care established by me.

## 2024-03-05 NOTE — REASON FOR VISIT
[Medical Office: ([facility])___] : at the medical office located in  [Home] : at home, [unfilled] , at the time of the visit. [Follow-Up: _____] : a [unfilled] follow-up visit

## 2024-03-08 ENCOUNTER — APPOINTMENT (OUTPATIENT)
Dept: THORACIC SURGERY | Facility: CLINIC | Age: 79
End: 2024-03-08
Payer: MEDICARE

## 2024-03-08 DIAGNOSIS — R94.2 ABNORMAL RESULTS OF PULMONARY FUNCTION STUDIES: ICD-10-CM

## 2024-03-08 PROCEDURE — 99442: CPT

## 2024-03-08 NOTE — END OF VISIT
[FreeTextEntry3] : I, Dr. Patrick Paul MD, personally performed the evaluation and management (E/M) services for this established patient who presents today with (a) new problem(s)/exacerbation of (an) existing condition(s). That E/M includes conducting the clinically appropriate interval history &/or exam, assessing all new/exacerbated conditions, and establishing a new plan of care. Today, my MARIANA, Nat Johnson NP, was here to observe &/or participate in the visit & follow plan of care established by me.

## 2024-03-11 NOTE — ASSESSMENT
[FreeTextEntry1] : Mr. Donis is a 78-year-old male, Cantonese speaking, former smoker, w/ a PMHx of HLD, DM, PAD, CAD s/p stents in 2006 & 2009. He is referred to our practice by his PCP Dr. Son Ibarra for evaluation of a lung nodule found on CT chest for lung cancer screening.   He is now s/p right VATs, robotic- assisted, right upper lobe posterior segmentectomy with mediastinal lymph node dissection. Surgical Pathology revealed PTmipN0, minimally invasive adenocarcinoma on 05/14/2021.   Surveillance CT scan on 02/19/2024 showed a persistent small right pleural effusion with increasing pleural based opacities and volume loss involving the right lower and middle lobes. Other lung nodules are stable. PET/CT scan for further evaluation was recommended.   Patient presents today for a follow up visit via telehealth to review the PET results.   I have independently reviewed the medical records and imaging at the time of this office consultation, and discussed the following interpretations with the patient:  PET-CT performed @  on 3/2/24 noted subpleural elongated airspace opacities that show mild FDG uptake in the right lung progressed since 2022. The patient's persistent small right pleural effusion shows pleural thickening that also exhibits FDG uptake. The patient's history of minimally invasive cancer makes this less likely, but underlying neoplasm must be considered. There are no FDG- avid disease outside the thorax. Will schedule the patient for a ct guided biopsy of the right pleura to determine a diagnosis. Will coordinate timing of procedure with IR. He was instructed to hold Plavix and aspirin for 5 days prior to surgery and given an rx for labs.   Plan: -CT guided biopsy of the right pleura -RTC to discuss results

## 2024-03-11 NOTE — HISTORY OF PRESENT ILLNESS
[FreeTextEntry1] : Miller Donis is a 78-year-old male, Cantonese speaking, former smoker, w/ a PMHx of HLD, DM, PAD, CAD s/p stents in 2006 & 2009. He is referred to our practice by his PCP Dr. Son Ibarra for evaluation of a lung nodule found on CT chest for lung cancer screening.   He is now s/p right VATs, robotic- assisted, right upper lobe posterior segmentectomy with mediastinal lymph node dissection. Surgical Pathology revealed PTmipN0, minimally invasive adenocarcinoma on 05/14/2021.   Surveillance CT scan on 02/19/2024 showed a persistent small right pleural effusion with increasing pleural based opacities and volume loss involving the right lower and middle lobes. Other lung nodules are stable. PET/CT scan for further evaluation was recommended.   Patient presents today for a follow up visit via telehealth to review the PET results. Report as follows:  PET-CT performed @  on 3/2/24: 1. Wedge resection of the right upper lobe  2. Subpleural elongated airspace opacities that demonstrate mild FDG uptake in the right lower greater than right middle and right upper lobes, similar in CT appearance to prior but progressed in CT appearance since 2022 (and the FDG uptake new since 2022). Persistent small right pleural effusion with minimal associated pleural thickening that also exhibits FDG uptake that is new since 2022. Neoplasm must be considered.  3. Since recent chest CT, interval development of a focal area of ground-glass opacity in the left lower lobe, with a morphology suggestive of multiple clustered/tree-in-bud ground-glass nodules. The morphology and rapidity of development suggests infection with small-airways component. Attention on follow-up chest CT is recommended.  4. No FDG- avid disease outside the thorax.   He reports feeling similar to his prior visit, states that he has some sob with activity and is still taking lasix ordered by his pcp to manage his chronic effusion.

## 2024-03-11 NOTE — CONSULT LETTER
[Dear  ___] : Dear  [unfilled], [Please see my note below.] : Please see my note below. [Consult Letter:] : I had the pleasure of evaluating your patient, [unfilled]. [Consult Closing:] : Thank you very much for allowing me to participate in the care of this patient.  If you have any questions, please do not hesitate to contact me. [Sincerely,] : Sincerely, [FreeTextEntry3] : Patrick Paul MD Professor, Cardiovascular & Thoracic Surgery [facility] [employer] Director of the [facility]  Director of Thoracic Surgery, [address] [place] [address] Phone: [phone] Fax: [phone]

## 2024-03-29 ENCOUNTER — RESULT REVIEW (OUTPATIENT)
Age: 79
End: 2024-03-29

## 2024-03-29 ENCOUNTER — APPOINTMENT (OUTPATIENT)
Dept: INTERVENTIONAL RADIOLOGY/VASCULAR | Facility: HOSPITAL | Age: 79
End: 2024-03-29

## 2024-03-29 ENCOUNTER — OUTPATIENT (OUTPATIENT)
Dept: OUTPATIENT SERVICES | Facility: HOSPITAL | Age: 79
LOS: 1 days | End: 2024-03-29
Payer: MEDICARE

## 2024-03-29 ENCOUNTER — APPOINTMENT (OUTPATIENT)
Dept: CT IMAGING | Facility: HOSPITAL | Age: 79
End: 2024-03-29

## 2024-03-29 DIAGNOSIS — Z41.9 ENCOUNTER FOR PROCEDURE FOR PURPOSES OTHER THAN REMEDYING HEALTH STATE, UNSPECIFIED: Chronic | ICD-10-CM

## 2024-03-29 LAB
GLUCOSE BLDC GLUCOMTR-MCNC: 107 MG/DL — HIGH (ref 70–99)
GRAM STN FLD: SIGNIFICANT CHANGE UP
NIGHT BLUE STAIN TISS: SIGNIFICANT CHANGE UP
SPECIMEN SOURCE: SIGNIFICANT CHANGE UP
SPECIMEN SOURCE: SIGNIFICANT CHANGE UP

## 2024-03-29 PROCEDURE — 87206 SMEAR FLUORESCENT/ACID STAI: CPT

## 2024-03-29 PROCEDURE — 88305 TISSUE EXAM BY PATHOLOGIST: CPT

## 2024-03-29 PROCEDURE — 32408 CORE NDL BX LNG/MED PERQ: CPT

## 2024-03-29 PROCEDURE — 87102 FUNGUS ISOLATION CULTURE: CPT

## 2024-03-29 PROCEDURE — 87070 CULTURE OTHR SPECIMN AEROBIC: CPT

## 2024-03-29 PROCEDURE — 87116 MYCOBACTERIA CULTURE: CPT

## 2024-03-29 PROCEDURE — 82962 GLUCOSE BLOOD TEST: CPT

## 2024-03-29 PROCEDURE — 88173 CYTOPATH EVAL FNA REPORT: CPT | Mod: 26

## 2024-03-29 PROCEDURE — 87015 SPECIMEN INFECT AGNT CONCNTJ: CPT

## 2024-03-29 PROCEDURE — 88305 TISSUE EXAM BY PATHOLOGIST: CPT | Mod: 26

## 2024-03-29 PROCEDURE — 71045 X-RAY EXAM CHEST 1 VIEW: CPT

## 2024-03-29 PROCEDURE — 77012 CT SCAN FOR NEEDLE BIOPSY: CPT

## 2024-03-29 PROCEDURE — 87075 CULTR BACTERIA EXCEPT BLOOD: CPT

## 2024-03-29 PROCEDURE — 32400 NEEDLE BIOPSY CHEST LINING: CPT

## 2024-03-29 PROCEDURE — 71045 X-RAY EXAM CHEST 1 VIEW: CPT | Mod: 26

## 2024-03-29 PROCEDURE — 88173 CYTOPATH EVAL FNA REPORT: CPT

## 2024-04-02 LAB — NON-GYNECOLOGICAL CYTOLOGY STUDY: SIGNIFICANT CHANGE UP

## 2024-04-03 LAB — SURGICAL PATHOLOGY STUDY: SIGNIFICANT CHANGE UP

## 2024-04-04 DIAGNOSIS — R91.8 OTHER NONSPECIFIC ABNORMAL FINDING OF LUNG FIELD: ICD-10-CM

## 2024-04-04 DIAGNOSIS — J94.9 PLEURAL CONDITION, UNSPECIFIED: ICD-10-CM

## 2024-04-04 DIAGNOSIS — J92.9 PLEURAL PLAQUE WITHOUT ASBESTOS: ICD-10-CM

## 2024-04-04 DIAGNOSIS — M47.894 OTHER SPONDYLOSIS, THORACIC REGION: ICD-10-CM

## 2024-04-07 LAB
CULTURE RESULTS: NO GROWTH — SIGNIFICANT CHANGE UP
SPECIMEN SOURCE: SIGNIFICANT CHANGE UP

## 2024-04-09 PROBLEM — Z08 ENCOUNTER FOR FOLLOW-UP SURVEILLANCE OF LUNG CANCER: Status: ACTIVE | Noted: 2022-04-26

## 2024-04-12 ENCOUNTER — APPOINTMENT (OUTPATIENT)
Dept: THORACIC SURGERY | Facility: CLINIC | Age: 79
End: 2024-04-12
Payer: MEDICARE

## 2024-04-12 DIAGNOSIS — R91.8 OTHER NONSPECIFIC ABNORMAL FINDING OF LUNG FIELD: ICD-10-CM

## 2024-04-12 DIAGNOSIS — J92.9 PLEURAL PLAQUE W/OUT ASBESTOS: ICD-10-CM

## 2024-04-12 DIAGNOSIS — Z08 ENCOUNTER FOR FOLLOW-UP EXAMINATION AFTER COMPLETED TREATMENT FOR MALIGNANT NEOPLASM: ICD-10-CM

## 2024-04-12 DIAGNOSIS — Z85.118 ENCOUNTER FOR FOLLOW-UP EXAMINATION AFTER COMPLETED TREATMENT FOR MALIGNANT NEOPLASM: ICD-10-CM

## 2024-04-12 DIAGNOSIS — J90 PLEURAL EFFUSION, NOT ELSEWHERE CLASSIFIED: ICD-10-CM

## 2024-04-12 PROCEDURE — 99447 NTRPROF PH1/NTRNET/EHR 11-20: CPT

## 2024-04-15 PROBLEM — R91.8 OPACITY OF LUNG ON IMAGING STUDY: Status: ACTIVE | Noted: 2023-11-28

## 2024-04-15 PROBLEM — J90 PLEURAL EFFUSION: Status: ACTIVE | Noted: 2024-02-21

## 2024-04-15 PROBLEM — J92.9 PLEURAL THICKENING: Status: ACTIVE | Noted: 2024-03-08

## 2024-04-15 NOTE — CONSULT LETTER
[FreeTextEntry3] : Patrick Paul MD Professor, Cardiovascular & Thoracic Surgery [facility] [employer] Director of the [facility]  Director of Thoracic Surgery, [address] [place] [address] Phone: [phone] Fax: [phone]

## 2024-04-15 NOTE — HISTORY OF PRESENT ILLNESS
[Family Member] : family member [Other:____] : [unfilled] [FreeTextEntry1] : Miller Donis is a 79-year-old male, Cantonese speaking, former smoker, w/ a PMHx of HLD, DM, PAD, CAD s/p stents in 2006 & 2009. He is referred to our practice by his PCP Dr. Son Ibarra for evaluation of a lung nodule found on CT chest for lung cancer screening.  He is now s/p right VATs, robotic- assisted, right upper lobe posterior segmentectomy with mediastinal lymph node dissection. Surgical Pathology revealed PTmipN0, minimally invasive adenocarcinoma on 05/14/2021.  Surveillance CT scan on 02/19/2024 showed a persistent small right pleural effusion with increasing pleural based opacities and volume loss involving the right lower and middle lobes. Other lung nodules are stable. Follow up PET/CT scan on 3/2/24 noted subpleural elongated airspace opacities that show mild FDG uptake in the right lung progressed since 2022. On 03/29/24, patient underwent CT guided core biopsy of right pleural lesion. He presents today for a follow up visit to discuss the pathology results. Report as follows:  1. Right pleural mass, core biopsy: -Scant lung parenchyma, 2.PLEURA, RIGHT, MASS FNA NON-DIAGNOSTIC. Rare mesothelial cells, macrophages and blood.  Patient is feeling well today and denies cough or chest pain. He states that he is still experiencing some periodic sob with activity.

## 2024-04-15 NOTE — ASSESSMENT
[FreeTextEntry1] : Miller Donis is a 79-year-old male, Cantonese speaking, former smoker, w/ a PMHx of HLD, DM, PAD, CAD s/p stents in 2006 & 2009. He is referred to our practice by his PCP Dr. Son Ibarra for evaluation of a lung nodule found on CT chest for lung cancer screening.  He is now s/p right VATs, robotic- assisted, right upper lobe posterior segmentectomy with mediastinal lymph node dissection. Surgical Pathology revealed PTmipN0, minimally invasive adenocarcinoma on 05/14/2021.  Surveillance CT scan on 02/19/2024 showed a persistent small right pleural effusion with increasing pleural based opacities and volume loss involving the right lower and middle lobes. Other lung nodules are stable. Follow up PET/CT scan on 3/2/24 noted subpleural elongated airspace opacities that show mild FDG uptake in the right lung progressed since 2022. On 03/29/24, patient underwent CT guided core biopsy of right pleural lesion. He presents today for a follow up visit to discuss the pathology results. Report as follows:  1. Right pleural mass, core biopsy: -Scant lung parenchyma, 2.PLEURA, RIGHT, MASS FNA NON-DIAGNOSTIC. Rare mesothelial cells, macrophages and blood.  Results were reviewed with the patient and family, there is no clear sign of malignancy noted in pathology findings. Recommend continuing to surveille these findings with a ct of the chest in 3 months. They were advised to follow up with pulmonology as needed for respiratory complaints.  All questions and concerns were addressed with the patient at the time of visit, who expressed understanding.   Plan: -ct of the chest in 3 months -phone call to review

## 2024-04-27 LAB
CULTURE RESULTS: SIGNIFICANT CHANGE UP
SPECIMEN SOURCE: SIGNIFICANT CHANGE UP

## 2024-05-15 LAB
CULTURE RESULTS: SIGNIFICANT CHANGE UP
SPECIMEN SOURCE: SIGNIFICANT CHANGE UP

## 2024-07-10 ENCOUNTER — APPOINTMENT (OUTPATIENT)
Dept: THORACIC SURGERY | Facility: CLINIC | Age: 79
End: 2024-07-10
Payer: MEDICARE

## 2024-07-10 ENCOUNTER — NON-APPOINTMENT (OUTPATIENT)
Age: 79
End: 2024-07-10

## 2024-07-10 DIAGNOSIS — J90 PLEURAL EFFUSION, NOT ELSEWHERE CLASSIFIED: ICD-10-CM

## 2024-07-10 DIAGNOSIS — Z08 ENCOUNTER FOR FOLLOW-UP EXAMINATION AFTER COMPLETED TREATMENT FOR MALIGNANT NEOPLASM: ICD-10-CM

## 2024-07-10 DIAGNOSIS — J98.11 ATELECTASIS: ICD-10-CM

## 2024-07-10 DIAGNOSIS — Z85.118 ENCOUNTER FOR FOLLOW-UP EXAMINATION AFTER COMPLETED TREATMENT FOR MALIGNANT NEOPLASM: ICD-10-CM

## 2024-07-10 PROCEDURE — 99443: CPT

## 2024-08-08 NOTE — HISTORY OF PRESENT ILLNESS
Render In Strict Bullet Format?: No
Initiate Treatment: TAC 0.5% topical cream
Detail Level: Zone
[FreeTextEntry1] : 76 year old male, Cantonese speaking, former smoker,  PMHx, HLD, DM, PAD< CAD s/p stents 2006, 2009, referred by PCP Dr. Son Ibarra for evaluation of lung nodule found on lung cancer screening CT. \par \par CT Chest 12/20/19\par - Stable RUL groundglass nodule dating back to 2015. Continued annual follow up is recommended. \par - Additional stable subcentimeter pulmonary nodules. \par - No new nodule is identified.\par \par CT Chest 4/23/21\par - Persistent 1.2cm subsolid ground-glass nodular opacity in the RUL. The overall size of the lesion is not significantly changed dating back to 2015. However, a developing small solid component (0.3cm) and increased density are noted within the lesion. This raises suspicious for a malignant process in the adenocarcinomatous spectrum. Histological correlation should be considered. \par - Additional subcentimeter nodules are stable. \par - Thyromegaly.\par - Hepatic steatosis.

## 2025-01-15 ENCOUNTER — NON-APPOINTMENT (OUTPATIENT)
Age: 80
End: 2025-01-15

## 2025-01-15 ENCOUNTER — APPOINTMENT (OUTPATIENT)
Dept: THORACIC SURGERY | Facility: CLINIC | Age: 80
End: 2025-01-15
Payer: MEDICARE

## 2025-01-15 DIAGNOSIS — R91.8 OTHER NONSPECIFIC ABNORMAL FINDING OF LUNG FIELD: ICD-10-CM

## 2025-01-15 DIAGNOSIS — Z08 ENCOUNTER FOR FOLLOW-UP EXAMINATION AFTER COMPLETED TREATMENT FOR MALIGNANT NEOPLASM: ICD-10-CM

## 2025-01-15 DIAGNOSIS — J90 PLEURAL EFFUSION, NOT ELSEWHERE CLASSIFIED: ICD-10-CM

## 2025-01-15 DIAGNOSIS — Z85.118 ENCOUNTER FOR FOLLOW-UP EXAMINATION AFTER COMPLETED TREATMENT FOR MALIGNANT NEOPLASM: ICD-10-CM

## 2025-01-15 DIAGNOSIS — J92.9 PLEURAL PLAQUE W/OUT ASBESTOS: ICD-10-CM

## 2025-01-15 PROCEDURE — 99212 OFFICE O/P EST SF 10 MIN: CPT | Mod: 93

## 2025-07-16 ENCOUNTER — APPOINTMENT (OUTPATIENT)
Dept: THORACIC SURGERY | Facility: CLINIC | Age: 80
End: 2025-07-16
Payer: MEDICARE

## 2025-07-16 ENCOUNTER — NON-APPOINTMENT (OUTPATIENT)
Age: 80
End: 2025-07-16

## 2025-07-16 PROCEDURE — 99213 OFFICE O/P EST LOW 20 MIN: CPT | Mod: 93
